# Patient Record
Sex: MALE | Race: AMERICAN INDIAN OR ALASKA NATIVE
[De-identification: names, ages, dates, MRNs, and addresses within clinical notes are randomized per-mention and may not be internally consistent; named-entity substitution may affect disease eponyms.]

---

## 2020-06-11 ENCOUNTER — HOSPITAL ENCOUNTER (INPATIENT)
Dept: HOSPITAL 5 - ED | Age: 65
LOS: 3 days | Discharge: HOME | DRG: 388 | End: 2020-06-14
Attending: INTERNAL MEDICINE | Admitting: INTERNAL MEDICINE
Payer: MEDICARE

## 2020-06-11 DIAGNOSIS — K56.609: Primary | ICD-10-CM

## 2020-06-11 DIAGNOSIS — Z82.49: ICD-10-CM

## 2020-06-11 DIAGNOSIS — E87.6: ICD-10-CM

## 2020-06-11 DIAGNOSIS — Z79.899: ICD-10-CM

## 2020-06-11 DIAGNOSIS — F17.200: ICD-10-CM

## 2020-06-11 DIAGNOSIS — N17.0: ICD-10-CM

## 2020-06-11 DIAGNOSIS — I73.9: ICD-10-CM

## 2020-06-11 DIAGNOSIS — E87.1: ICD-10-CM

## 2020-06-11 DIAGNOSIS — I10: ICD-10-CM

## 2020-06-11 LAB
ALBUMIN SERPL-MCNC: (no result) G/DL (ref 3.9–5)
ALBUMIN SERPL-MCNC: 3.7 G/DL (ref 3.9–5)
ALT SERPL-CCNC: (no result) UNITS/L (ref 7–56)
ALT SERPL-CCNC: 12 UNITS/L (ref 7–56)
APTT BLD: (no result) SEC. (ref 24.2–36.6)
APTT BLD: 25.7 SEC. (ref 24.2–36.6)
BILIRUB DIRECT SERPL-MCNC: 0.3 MG/DL (ref 0–0.2)
BUN SERPL-MCNC: (no result) MG/DL (ref 9–20)
BUN SERPL-MCNC: 46 MG/DL (ref 9–20)
BUN/CREAT SERPL: (no result) %
BUN/CREAT SERPL: 20 %
CALCIUM SERPL-MCNC: (no result) MG/DL (ref 8.4–10.2)
CALCIUM SERPL-MCNC: 8.6 MG/DL (ref 8.4–10.2)
HCT VFR BLD CALC: 44 % (ref 35.5–45.6)
HEMOLYSIS INDEX: (no result)
HEMOLYSIS INDEX: 7
HGB BLD-MCNC: 15.3 GM/DL (ref 11.8–15.2)
INR PPP: (no result) (ref 0.87–1.13)
INR PPP: 1.47 (ref 0.87–1.13)
MCHC RBC AUTO-ENTMCNC: 35 % (ref 32–34)
MCV RBC AUTO: 98 FL (ref 84–94)
PLATELET # BLD: 132 K/MM3 (ref 140–440)
RBC # BLD AUTO: 4.5 M/MM3 (ref 3.65–5.03)

## 2020-06-11 PROCEDURE — 85007 BL SMEAR W/DIFF WBC COUNT: CPT

## 2020-06-11 PROCEDURE — 36415 COLL VENOUS BLD VENIPUNCTURE: CPT

## 2020-06-11 PROCEDURE — 83735 ASSAY OF MAGNESIUM: CPT

## 2020-06-11 PROCEDURE — 85025 COMPLETE CBC W/AUTO DIFF WBC: CPT

## 2020-06-11 PROCEDURE — 84100 ASSAY OF PHOSPHORUS: CPT

## 2020-06-11 PROCEDURE — 84132 ASSAY OF SERUM POTASSIUM: CPT

## 2020-06-11 PROCEDURE — 85730 THROMBOPLASTIN TIME PARTIAL: CPT

## 2020-06-11 PROCEDURE — 74174 CTA ABD&PLVS W/CONTRAST: CPT

## 2020-06-11 PROCEDURE — 87641 MR-STAPH DNA AMP PROBE: CPT

## 2020-06-11 PROCEDURE — 93005 ELECTROCARDIOGRAM TRACING: CPT

## 2020-06-11 PROCEDURE — 74018 RADEX ABDOMEN 1 VIEW: CPT

## 2020-06-11 PROCEDURE — 82140 ASSAY OF AMMONIA: CPT

## 2020-06-11 PROCEDURE — 0D9670Z DRAINAGE OF STOMACH WITH DRAINAGE DEVICE, VIA NATURAL OR ARTIFICIAL OPENING: ICD-10-PCS | Performed by: INTERNAL MEDICINE

## 2020-06-11 PROCEDURE — 85027 COMPLETE CBC AUTOMATED: CPT

## 2020-06-11 PROCEDURE — 80053 COMPREHEN METABOLIC PANEL: CPT

## 2020-06-11 PROCEDURE — 80048 BASIC METABOLIC PNL TOTAL CA: CPT

## 2020-06-11 PROCEDURE — 85610 PROTHROMBIN TIME: CPT

## 2020-06-11 PROCEDURE — 96374 THER/PROPH/DIAG INJ IV PUSH: CPT

## 2020-06-11 PROCEDURE — 80076 HEPATIC FUNCTION PANEL: CPT

## 2020-06-11 PROCEDURE — 96375 TX/PRO/DX INJ NEW DRUG ADDON: CPT

## 2020-06-11 RX ADMIN — Medication SCH ML: at 22:55

## 2020-06-11 RX ADMIN — SODIUM CHLORIDE SCH MLS/HR: 0.9 INJECTION, SOLUTION INTRAVENOUS at 22:56

## 2020-06-11 RX ADMIN — POTASSIUM CHLORIDE SCH MLS/HR: 10 INJECTION, SOLUTION INTRAVENOUS at 19:55

## 2020-06-11 RX ADMIN — POTASSIUM CHLORIDE SCH MLS/HR: 10 INJECTION, SOLUTION INTRAVENOUS at 18:45

## 2020-06-11 NOTE — CONSULTATION
History of Present Illness





- Reason for Consult


Consult date: 06/11/20


acute renal failure





- History of Present Illness


The patient is a 64 YO male with history significant for Hypertension, AAA and 

erectile dysfunction s/p penile implant who presented to [facility] ED 6/11 from the 

primary care physician's office with complaints of abdominal pain, nausea and 

vomiting for the past 3 days.  He has several episodes of non-bloody non-bilious

vomiting and associated with constipation.  He went to his primary care's office

for evaluation.  They recommended that he come to the emergency room for further

evaluation.  Evaluation emergency department revealed a small bowel obstruction.

 General surgery was consulted.  Initial BP was 90/70.  Labs significant for 

Creat 2.3, BUN 46 and K 3.  In 2018 his creatinine was normal.  Nephrology was 

consulted for further evaluation.





Past History


Past Medical History: hypertension, hyperlipidemia, PVD, other (See HPI.)


Past Surgical History: abd. aortic aneurysm repair, hernia repair (LIH (open)), 

Other (penile implant)


Social history: smoking.  denies: alcohol abuse


Family history: no significant family history





Medications and Allergies


                                    Allergies











Allergy/AdvReac Type Severity Reaction Status Date / Time


 


No Known Allergies Allergy   Unverified 11/10/18 09:51











                                Home Medications











 Medication  Instructions  Recorded  Confirmed  Last Taken  Type


 


AtorvaSTATin 40 mg PO QHS 06/11/20 06/11/20 Unknown History


 


Metoprolol Succinate 50 mg PO BID 06/11/20 06/11/20 Unknown History











Active Meds: 


Active Medications





Acetaminophen (Tylenol)  650 mg PO Q4H PRN


   PRN Reason: Pain MILD(1-3)/Fever >100.5/HA


Albuterol (Proventil)  2.5 mg IH Q4HRT PRN


   PRN Reason: Shortness Of Breath


Hydralazine HCl (Apresoline)  10 mg IV Q6HR PRN


   PRN Reason: Hypertension


Sodium Chloride (Nacl 0.9% 1000 Ml)  1,000 mls @ 100 mls/hr IV AS DIRECT LEO


Morphine Sulfate (Morphine)  2 mg IV Q4H PRN


   PRN Reason: Pain, Moderate (4-6)


Ondansetron HCl (Zofran)  4 mg IV Q8H PRN


   PRN Reason: Nausea And Vomiting


Sodium Chloride (Sodium Chloride Flush Syringe 10 Ml)  10 ml IV BID LEO


Sodium Chloride (Sodium Chloride Flush Syringe 10 Ml)  10 ml IV PRN PRN


   PRN Reason: LINE FLUSH











Review of Systems


Constitutional: weight loss, anorexia, fatigue, no weight gain, no fever, no 

chills


Cardiovascular: high blood pressure, no chest pain, no orthopnea, no edema, no 

syncope, no lightheadedness, no shortness of breath, no leg edema


Respiratory: no cough, no hemoptysis, no shortness of breath, no dyspnea on 

exertion


Gastrointestinal: abdominal pain, nausea, vomiting, constipation, no diarrhea, 

no hematemesis, no melena, no hematochezia


Genitourinary Male: no dysuria, no hematuria


Rectal: no bleeding


Integumentary: no rash


Neurological: no convulsions, no aphasia, no change in speech, no change in 

mentation, no confusion





Exam





- Vital Signs


Vital signs: 


                                   Vital Signs











Temp Pulse Resp BP Pulse Ox


 


 98.6 F   110 H  24   90/70   97 


 


 06/11/20 14:31  06/11/20 14:31  06/11/20 14:31  06/11/20 14:31  06/11/20 14:31














- General Appearance


General appearance: well-developed, appears stated age, other (no tin distress, 

NGT )


EENT: ATNC, PERRL, mucous membranes dry, hearing intact, vision intact


Neck: Present: neck supple, trachea midline


Respiratory: Clear to Ascultation


Heart: regular, S1S2, no murmurs


Gastrointestinal: Present: normoactive bowel sounds.  Absent: tenderness, 

distended


Integumentary: no rash, warm and dry


Neurologic: no focal deficit, no asterixis, alert and oriented x3


Musculoskeletal: Present: other (no edema)


Psychiatric: cooperative





Results





- Lab Results





                                 06/11/20 16:31





                                 06/11/20 16:31


                             Most recent lab results











Calcium  8.6 mg/dL (8.4-10.2)   06/11/20  16:31    


 


Magnesium  2.10 mg/dL (1.7-2.3)   06/11/20  16:31    














- Image


Kidney/bladder ultrasound: other





Assessment and Plan





1. Acute kidney injury:


Vasomotor ILEANA in setting of volume depletion.


Previously normal renal function.


CT abdomen was negative for hydro.


Urine studies ordered.


Patient also received IV contrast.


Continue IV fluids.


Monitor renal function.


Renal prognosis is guarded.


Avoid nephrotoxic agents.


Meds dosage based on GFR. 





2. FEN:


Hypokalemia, replete K.


Monitor lytes and volume status.





3. SBO (small bowel obstruction):


General Surgery consulted.


NG tube decompression.


IV fluids.





4. Hypertension:


Follow BP, was low.





5. H/o AAA repair.

## 2020-06-11 NOTE — CAT SCAN REPORT
CTA ABDOMEN AND PELVIS WITH IV CONTRAST



INDICATION: History of aortic aneurysm, abdominal pain



CONTRAST: 100 cc Omnipaque 300 IV



COMPARISON: Partial images sets from CT abdomen and pelvis 11/11/2018 and CTA abdomen and pelvis 11/1
2/2018



Three-plane MIP reconstructions were produced. All CT scans at this location are performed using CT d
ose reduction for ALARA by means of automated exposure control. 



FINDINGS: Mild atelectasis and/or scarring is seen in the lung bases, more on the right. No pneumoper
itoneum is seen. Interval revision of the penile prosthesis apparatus is noted with now a catheter an
d apparent balloon pump extending into the left lower lateral abdominal wall subcutaneous tissues. In
 this subcutaneous area adjacent to the device is a somewhat serpiginous fluid collection which is pr
esumably part of the inflation balloon. Though somewhat resembling a loop of bowel shape, I do not se
e a definite herniation of bowel through this area.



No urinary obstructive changes are seen. No masses are obvious. Gallbladder and bile ducts appear wit
hin normal limits. No free fluid is seen.



The lower abdominal aortic aneurysm and dissection is now less prominent and there are interval surgi
reji changes in this area. The dissection is similar in appearance and less extensive and the prominen
t saccular aneurysm which measured up to a diameter of 7 cm previously now shows a maximal diameter o
f 3.9 cm. The tortuous and ectatic common iliac arteries are again seen similar to prior study with f
low seen more distally in the iliac system though tapering inferiorly, thought likely due to the phas
e of contrast in this very early contrast phase with significant contrast left in the heart at this p
oint. I do not see extravasation or other obvious acute abnormality of the aorta or vascular structur
es. Good opacification of the major aortic branches is seen without obvious significant stenosis thou
gh there is mild narrowing of the proximal portion of the left renal artery. Left renal artery shows 
a small accessory artery as well. The mesenteric vessels appear well opacified.



The stomach is prominently dilated with fluid, food, and air, predominantly fluid. There is prominent
 dilatation of the proximal half of the small bowel with air-fluid levels seen. Distal small bowel lo
ops are not dilated. Colon is mostly decompressed. Pattern is consistent with high-grade small bowel 
obstruction of unclear source. The rectum may show mild wall edema as can be seen with proctitis and 
was not obvious previously. No evidence of perforation or abscess is seen.





IMPRESSION: 

1. Significant improvement in appearance of the lower abdominal aortic dissection and saccular aneury
sm with interval surgical change. No acute aortic abnormality is seen.

2. Evidence of high-grade mid level small bowel obstruction

3. Interval surgical changes in the pelvis as above. The subcutaneous fluid collection presumably rel
ates to a balloon apparatus with fluid for the penile pump.

4. Question of mild proctitis



Discussed with Dr. Kiser



Signer Name: Mika Benson MD 

Signed: 6/11/2020 4:43 PM

Workstation Name: [id number]

## 2020-06-11 NOTE — EMERGENCY DEPARTMENT REPORT
ED General Adult HPI





- General


Chief complaint: Abdominal Pain


Stated complaint: ABD PAIN


PUI?: No


Time Seen by Provider: 20 14:25


Source: patient, EMS ( EMS documentation not available at time of chart 

dictation ), RN notes reviewed, old records reviewed


Mode of arrival: Stretcher


Limitations: No Limitations





- History of Present Illness


Initial comments: 





Patient is a 65-year-old gentleman.  He has a complicated past medical history. 

He has a known history of infrarenal aortic aneurysm with partial dissection.





He also has a history of penile prosthesis.





He had an outpatient CT scan of the abdomen pelvis performed recently, for 

nonspecific abdominal pain, which was a technically inadequate study, secondary 

to lack of IV contrast.  He presents to the ER today with a complaint of diffuse

abdominal pain, initially without nausea or vomiting..  He denies headache, neck

pain, chest pain, shortness of breath, hematemesis, bright red blood per rectum.

 He also thinks that he is "constipated."





He denies extremity weakness and or numbness.





He reports decrease stool, but is passing gas intermittently.





Symptoms present over the past few days, and gradually getting worse.  He does 

not describe exacerbating or relieving factors.  At the moment, he is not 

nauseous or vomiting.














-: Gradual, days(s)


Location: abdomen


Severity scale (0 -10): 10


Consistency: constant


Improves with: none


Worsens with: none





- Related Data


                                Home Medications











 Medication  Instructions  Recorded  Confirmed  Last Taken


 


AtorvaSTATin 40 mg PO QHS 20 Unknown


 


Metoprolol Succinate 50 mg PO BID 20 Unknown











                                    Allergies











Allergy/AdvReac Type Severity Reaction Status Date / Time


 


No Known Allergies Allergy   Unverified 11/10/18 09:51














ED Review of Systems


ROS: 


Stated complaint: ABD PAIN


Other details as noted in HPI





Constitutional: weakness.  denies: fever


Eyes: denies: eye discharge


ENT: denies: epistaxis


Respiratory: denies: cough


Cardiovascular: denies: chest pain


Gastrointestinal: abdominal pain, constipation


Genitourinary: denies: dysuria, testicular pain


Musculoskeletal: denies: back pain


Skin: denies: lesions


Neurological: weakness


Psychiatric: as per HPI


Hematological/Lymphatic: as per HPI





ED Past Medical Hx





- Past Medical History


Previous Medical History?: Yes


Hx Hypertension: Yes


Hx Congestive Heart Failure: No


Hx Diabetes: No


Hx Asthma: No


Hx COPD: No


Additional medical history: Constipation, Left eye prosthesis, Penile implant, 

aortic aneurysm repair





- Surgical History


Past Surgical History?: Yes


Additional Surgical History: left eye surgery, Penile surgery, aortic aneurysm 

repair





- Social History


Smoking Status: Never Smoker


Substance Use Type: None





- Medications


Home Medications: 


                                Home Medications











 Medication  Instructions  Recorded  Confirmed  Last Taken  Type


 


AtorvaSTATin 40 mg PO QHS 20 Unknown History


 


Metoprolol Succinate 50 mg PO BID 20 Unknown History














ED Physical Exam





- General


Limitations: No Limitations


General appearance: alert, anxious





- Head


Head exam: Present: atraumatic, normocephalic





- Eye


Eye exam: Present: normal appearance, EOMI.  Absent: nystagmus





- ENT


ENT exam: Present: normal exam, mucous membranes dry, normal external ear exam





- Neck


Neck exam: Present: normal inspection, full ROM.  Absent: tenderness, 

meningismus





- Respiratory


Respiratory exam: Present: normal lung sounds bilaterally.  Absent: respiratory 

distress, wheezes, rales, rhonchi, stridor





- Cardiovascular


Cardiovascular Exam: Present: tachycardia, irregular rhythm, normal heart 

sounds.  Absent: systolic murmur, diastolic murmur, rubs, gallop





- GI/Abdominal


GI/Abdominal exam: Present: soft, distended, diminished bowel sounds.  Absent: 

tenderness, guarding, rebound, rigid, pulsatile mass





- Rectal


Rectal exam: Present: deferred





- Extremities Exam


Extremities exam: Present: normal inspection, full ROM, other (2+ femoral pulses

noted bilaterally.  Weakened pulses noted in the bilateral upper and lower 

extremities.  Pelvis is stable.  No long bony tenderness.  Delayed capillary 

refill).  Absent: normal capillary refill (Delayed capillary refill), calf 

tenderness





- Back Exam


Back exam: Present: normal inspection, full ROM.  Absent: tenderness, CVA 

tenderness (R), CVA tenderness (L), paraspinal tenderness, vertebral tenderness





- Neurological Exam


Neurological exam: Present: alert, other (No facial droop.  Tongue midline.  

Extraocular movements intact bilaterally.  Facial sensation intact to light 

touch in V1, V2, V3 distribution bilaterally.  5 and a 5 strength in 4 e

xtremities.  Sensation intact to light touch in 4 extremities.).  Absent: motor 

sensory deficit





- Psychiatric


Psychiatric exam: Present: flat affect





- Skin


Skin exam: Present: warm, dry, intact, normal color.  Absent: rash





ED Course


                                   Vital Signs











  20





  14:31 15:32 16:00


 


Temperature 98.6 F  


 


Pulse Rate 110 H 81 87


 


Respiratory 24 13 21





Rate   


 


Blood Pressure   117/85


 


Blood Pressure 90/70  





[Left]   


 


O2 Sat by Pulse 97 62 L 





Oximetry   














  20





  16:30 16:40 16:50


 


Temperature   


 


Pulse Rate 75 74 90


 


Respiratory 28 H 15 15





Rate   


 


Blood Pressure 116/63 116/63 116/63


 


Blood Pressure   





[Left]   


 


O2 Sat by Pulse  100 





Oximetry   














  20





  17:00 17:10 17:20


 


Temperature   


 


Pulse Rate 79 82 85


 


Respiratory 15 24 16





Rate   


 


Blood Pressure 124/80 124/80 124/80


 


Blood Pressure   





[Left]   


 


O2 Sat by Pulse   





Oximetry   














  20





  17:30 17:40 17:50


 


Temperature   


 


Pulse Rate 84 83 89


 


Respiratory 13 18 21





Rate   


 


Blood Pressure 126/90 126/90 126/90


 


Blood Pressure   





[Left]   


 


O2 Sat by Pulse 77 L  





Oximetry   














  20





  18:00 18:10 18:20


 


Temperature   


 


Pulse Rate 77 86 83


 


Respiratory 15 19 16





Rate   


 


Blood Pressure 138/84 138/84 138/84


 


Blood Pressure   





[Left]   


 


O2 Sat by Pulse  93 





Oximetry   














  20





  18:30 18:40 18:41


 


Temperature   


 


Pulse Rate 83 80 83


 


Respiratory 18 14 18





Rate   


 


Blood Pressure 119/86 119/86 


 


Blood Pressure   119/86





[Left]   


 


O2 Sat by Pulse   





Oximetry   














  20





  18:50 19:00 19:10


 


Temperature   


 


Pulse Rate 79 80 79


 


Respiratory 23 18 11 L





Rate   


 


Blood Pressure 119/86 116/88 116/88


 


Blood Pressure   





[Left]   


 


O2 Sat by Pulse 93  94





Oximetry   














  20





  19:20 19:30 19:40


 


Temperature   


 


Pulse Rate 81 75 81


 


Respiratory 17 20 21





Rate   


 


Blood Pressure 116/88 125/84 125/84


 


Blood Pressure   





[Left]   


 


O2 Sat by Pulse 94 94 





Oximetry   














  20





  19:50


 


Temperature 


 


Pulse Rate 73


 


Respiratory 16





Rate 


 


Blood Pressure 125/84


 


Blood Pressure 





[Left] 


 


O2 Sat by Pulse 88





Oximetry 














- EJ/Peripheral Line


  ** Arm R


Time Out Performed: Yes


Indications: nurses unable to establis


Skin Cleansed in Sterile Fashion: Yes


Size: 20


Dressing Placed: Tegaderm


Patient Tolerated Procedure: well





ED Medical Decision Making





- Lab Data


Result diagrams: 


                                 20 05:26





                                 20 08:02








                                   Vital Signs











  20





  14:31 15:32 16:00


 


Temperature 98.6 F  


 


Pulse Rate 110 H 81 87


 


Respiratory 24 13 21





Rate   


 


Blood Pressure   117/85


 


Blood Pressure 90/70  





[Left]   


 


O2 Sat by Pulse 97 62 L 





Oximetry   











                                   Lab Results











  20 Range/Units





  15:45 15:45 15:45 


 


PT  TNR    


 


INR  TNR    


 


APTT  TNR    


 


Sodium   148 H   (137-145)  mmol/L


 


Chloride   136.8 H   ()  mmol/L


 


BUN   9   (9-20)  mg/dL


 


Creatinine   0.2 L   (0.8-1.5)  mg/dL


 


Estimated GFR   > 60   ml/min


 


BUN/Creatinine Ratio   45   %


 


Lactic Acid    0.40 L  (0.7-2.0)  mmol/L


 


Total Bilirubin   < 0.20   (0.1-1.2)  mg/dL


 


Alkaline Phosphatase   6 L   ()  units/L


 


Total Protein   0.8 L   (6.3-8.2)  g/dL


 


Albumin   0.5 L   (3.9-5)  g/dL


 


Albumin/Globulin Ratio   1.7   %














- EKG Data





20 17:08


EKG shows sinus rhythm, 76 bpm, normal axis, QTC is prolonged, left ventricular 

hypertrophy, and motion artifact.  The EKG is abnormal.  It is not a STEMI.  

There is no prior EKG available for my comparison





- Radiology Data


Radiology results: report reviewed, image reviewed





Print Report











Referring Physician: DESMOND KISER 


 


Patient Name: NESSA MARAVILLA 


 


Patient ID: [id number] 


 


YOB: 1955 


 


Sex: Male 


 


Accession: [id number] 


 


Report Date: 2020 


 


Report Status: Finalized 


 


Findings


[facility] [address] [place] [address] Cat

 Scan Report Signed Patient: NESSA MARAVILLA MR#:  [id number] : 1955 

Acct:[id number] Age/Sex: 65 / M ADM Date: 20 Loc: ED Attending Dr: 

Ordering Physician: DESMOND KISER MD Date of Service: 20 Procedure(s):

 CT angio abdomen pelvis Accession Number(s): [id number] cc: DESMOND KISER MD 

CTA ABDOMEN AND PELVIS WITH IV CONTRAST INDICATION: History of aortic aneurysm, 

abdominal pain CONTRAST: 100 cc Omnipaque 300 IV COMPARISON: Partial images sets

 from CT abdomen and pelvis 2018 and CTA abdomen and pelvis 2018 

Three-plane MIP reconstructions were produced. All CT scans at this location are

 performed using CT dose reduction for ALARA by means of automated exposure 

control. FINDINGS: Mild atelectasis and/or scarring is seen in the lung bases, 

more on the right. No pneumoperitoneum is seen. Interval revision of the penile 

prosthesis apparatus is noted with now a catheter and apparent balloon pump 

extending into the left lower lateral abdominal wall subcutaneous tissues. In 

this subcutaneous area adjacent to the device is a somewhat serpiginous fluid 

collection which is presumably part of the inflation balloon. Though somewhat 

resembling a loop of bowel shape, I do not see a definite herniation of bowel 

through this area. No urinary obstructive changes are seen. No masses are 

obvious. Gallbladder and bile ducts appear within normal limits. No free fluid 

is seen. The lower abdominal aortic aneurysm and dissection is now less 

prominent and there are interval surgical changes in this area. The dissection 

is similar in appearance and less extensive and the prominent saccular aneurysm 

which measured up to a diameter of 7 cm previously now shows a maximal diameter 

of 3.9 cm. The tortuous and ectatic common iliac arteries are again seen similar

 to prior study with flow seen more distally in the iliac system though tapering

 inferiorly, thought likely due to the phase of contrast in this very early 

contrast phase with significant contrast left in the heart at this point. I do 

not see extravasation or other obvious acute abnormality of the aorta or 

vascular structures. Good opacification of the major aortic branches is seen 

without obvious significant stenosis though there is mild narrowing of the 

proximal portion of the left renal artery. Left renal artery shows a small 

accessory artery as well. The mesenteric vessels appear well opacified. The 

stomach is prominently dilated with fluid, food, and air, predominantly fluid. 

There is prominent dilatation of the proximal half of the small bowel with air-

fluid levels seen. Distal small bowel loops are not dilated. Colon is mostly 

decompressed. Pattern is consistent with high-grade small bowel obstruction of u

nclear source. The rectum may show mild wall edema as can be seen with proctitis

 and was not obvious previously. No evidence of perforation or abscess is seen. 








IMPRESSION: 1. Significant improvement in appearance of the lower abdominal 

aortic dissection and saccular aneurysm with interval surgical change. No acute 

aortic abnormality is seen. 2. Evidence of high-grade mid level small bowel 

obstruction 3. Interval surgical changes in the pelvis as above. The 

subcutaneous fluid collection presumably relates to a balloon apparatus with 

fluid for the penile pump. 4. Question of mild proctitis Discussed with Dr. Kiser Signer Name: Mika Benson MD Signed: 2020 4:43 PM Workstation 

Name: [id number] Transcribed By: RAMONA Dictated By: Mika Benson MD 

Electronically Authenticated By: Mika Benson MD Signed Date/Time: 

20 1643 DD/DT: 20 1632 TD/TT:   














- Medical Decision Making





Differential diagnosis, including but not limited to: Aortic dissection, AAA, 

obstruction








Assessment and plan: 65-year-old gentleman with complex abdominal surgical 

history, presenting with abdominal pain, initially hypotensive, blood pressure 

in the 90s, initially tachycardic on my exam, heart rate in the 130s, with 

thready pulses.  We were initially very concerned about aortic catastrophe.  

Therefore, we recommended emergent CT angiogram of the abdomen pelvis to further

 evaluate.  Risks, benefits, alternatives discussed with patient extensively.  

He gave verbal consent for emergent CT scan with IV contrast to evaluate for 

aortic catastrophe. 


I specifically counseled the patient about the risks of renal insufficiency, and

 possible need for hemodialysis downstream, however, I also advised patient that

 given his initial hypotension, thready pulses, hemodynamic instability 

initially, we needed to obtain expedited and definitive diagnostic imaging.  

Therefore, the patient provided verbal and written consent for CT angiogram with

 IV contrast.  This was witnessed by multiple ER staff members, including Mr. Earnest Oh.





 Fortunately, no aortic emergent condition was noted.  However, he was noted to 

have a high-grade small bowel obstruction.





In addition, he is now actively vomiting.





We will treat his symptoms with fluids, pain medication, and nausea medication.





Advised patient that we would recommend admission to the hospital for supportive

 care.





Contacted general surgery on-call, Dr. Saunders, discussed the patient's history, 

physical, and pertinent imaging studies.  Nasogastric tube to be ordered.  

Discussed findings with patient, who verbalized understanding.  He states he is 

amenable to hospitalization.





Hospital physician, Dr. Hanley to admit patient to the medical service.





He was found to have renal insufficiency,  likely multifactorial.  Contacted 

nephrology on-call, Dr. Rosales, his group will follow in consultation.








Critical Care Time: Yes


Critical care time in (mins) excluding proc time.: 35


Critical care attestation.: 


If time is entered above; I have spent that time in minutes in the direct care 

of this critically ill patient, excluding procedure time.





Critical Care Time: 





Critical care time includes multiple bedside re-evaluations, interpretation of 

laboratory studies, current radiology studies, prior radiology studies, 

discussion with multiple consulting services, including general surgery, 

hospital medicine, and nephrology.  This does not include procedure time.





ED Disposition


Clinical Impression: 


 SBO (small bowel obstruction), Renal insufficiency, Hypokalemia





Disposition:  OP ADMIT IP TO THIS HOSP


Is pt being admited?: Yes


Does the pt Need Aspirin: No


Condition: Serious

## 2020-06-11 NOTE — HISTORY AND PHYSICAL REPORT
History of Present Illness


Chief complaint: 





I have pain in my stomach


History of present illness: 


66 YO Male with HTN, AAA S/P Repair, Chronic Constipation presents to ED for 

evaluation.  Patient states that he has experienced abdominal pain over the last

3 days.  Patient states that his pain is currently 10/10, crampy, associated 

with nausea and multiple episodes of vomiting.  Patient underwent a CT scan of 

the abdomen and pelvis as an outpatient on yesterday and presented to his 

primary care physician's office today to review results.  Patient was seen and 

evaluated by his primary care physician and instructed to seek further care.  

EMS notified and the patient was subsequently transported to [facility] for further 

evaluation and care.  Patient seen and evaluated in the emergency department.  

Lab and imaging studies were reviewed.  Patient found to have evidence of a 

small bowel obstruction.  Surgical team consulted in ED.  Patient underwent 

placement of an NG tube for gastric decompression.  Patient admitted to surgical

floor due to increased risk of decompensation.  Patient denies fever, chills, 

chest pain, palpitation, productive cough, skin rash, recent ill contact, 

hematemesis, bright red blood per rectum, melena, ingestion of food/water from 

new or different sources.  Prior admission on 11/10/2018 reviewed.  All 

medication listed at time of admission has been reconciled.  Advanced care 

planning conducted in the emergency department.








Past History


Past Medical History: hypertension, other (See HPI)


Past Surgical History: Other (left eye surgery, Penile surgery, aortic aneurysm 

repair)


Social history: single.  denies: smoking, alcohol abuse, prescription drug abuse


Family history: hypertension





Medications and Allergies


                                    Allergies











Allergy/AdvReac Type Severity Reaction Status Date / Time


 


No Known Allergies Allergy   Unverified 11/10/18 09:51











                                Home Medications











 Medication  Instructions  Recorded  Confirmed  Last Taken  Type


 


Valsartan [Diovan] 80 mg PO DAILY 11/10/18 11/10/18 11/10/18 History











Active Meds: 


Active Medications





Potassium Chloride (Kcl 10meq/100ml)  10 meq in 100 mls @ 100 mls/hr IV Q1H LEO


   Stop: 06/11/20 19:59











Review of Systems


Constitutional: no weight loss, no weight gain, no fever, no chills


Ears, nose, mouth and throat: no ear pain, no ear discharge, no tinnitis, no 

decreased hearing, no nasal congestion, no nasal discharge


Cardiovascular: no chest pain, no orthopnea, no palpitations, no rapid/irregular

 heart beat, no edema, no syncope, no lightheadedness


Respiratory: no cough, no cough with sputum, no excessive sputum, no shortness 

of breath, no dyspnea on exertion


Gastrointestinal: abdominal pain, nausea, vomiting, no diarrhea, no 

constipation, no change in bowel habits, no hematemesis, no coffee ground 

emesis, no BRBPR, no melena, no hematochezia


Genitourinary Male: no hematuria, no flank pain, no discharge, no urinary 

frequency, no urinary hesitancy


Rectal: no pain, no incontinence, no bleeding


Musculoskeletal: no neck stiffness, no neck pain, no shooting arm pain, no arm 

numbness/tingling, no low back pain


Integumentary: no rash, no pruritis, no redness, no sores, no wounds, no 

jaundice


Neurological: no transient paralysis, no paralysis, no weakness, no parathesias,

 no numbness, no tingling, no seizures, no syncope


Psychiatric: no anxiety, no memory loss, no change in sleep habits, no sleep 

disturbances, no insomnia, no hypersomnia, no change in libido, no 

disorientation


Endocrine: no cold intolerance, no heat intolerance, no excessive sweating, no 

flushing


Hematologic/Lymphatic: no easy bruising, no easy bleeding, no lymphadenopathy


Allergic/Immunologic: no urticaria, no allergic rhinitis, no persistent 

infections, no anaphylaxis, no angioedema





Exam





- Constitutional


Vitals: 


                                        











Temp Pulse Resp BP Pulse Ox


 


 98.6 F   87   21   117/85   62 L


 


 06/11/20 14:31  06/11/20 16:00  06/11/20 16:00  06/11/20 16:00  06/11/20 15:32











General appearance: Present: mild distress





- EENT


Eyes: Present: PERRL


ENT: hearing intact, clear oral mucosa





- Neck


Neck: Present: supple, normal ROM





- Respiratory


Respiratory effort: normal


Respiratory: bilateral: CTA





- Cardiovascular


Heart Sounds: Present: S1 & S2.  Absent: rub, click





- Extremities


Extremities: pulses symmetrical, No edema


Peripheral Pulses: within normal limits





- Abdominal


General gastrointestinal: Present: soft, non-tender, tender, normal bowel 

sounds.  Absent: hepatomegaly, splenomegaly


Male genitourinary: Present: normal





- Integumentary


Integumentary: Present: clear, warm, dry





- Musculoskeletal


Musculoskeletal: gait normal, strength equal bilaterally





- Psychiatric


Psychiatric: appropriate mood/affect, intact judgment & insight





- Neurologic


Neurologic: CNII-XII intact, moves all extremities





Results





- Labs


CBC & Chem 7: 


                                 06/11/20 16:31





                                 06/11/20 16:31


Labs: 


                              Abnormal lab results











  06/11/20 06/11/20 06/11/20 Range/Units





  15:45 16:31 16:31 


 


Hgb   15.3 H   (11.8-15.2)  gm/dl


 


MCV   98 H   (84-94)  fl


 


MCH   34 H   (28-32)  pg


 


MCHC   35 H   (32-34)  %


 


Plt Count   132 L   (140-440)  K/mm3


 


PT     (12.2-14.9)  Sec.


 


INR     (0.87-1.13)  


 


Sodium    135 L  (137-145)  mmol/L


 


Potassium    3.0 L  (3.6-5.0)  mmol/L


 


Chloride    93.8 L  ()  mmol/L


 


BUN    46 H  (9-20)  mg/dL


 


Creatinine    2.3 H  (0.8-1.5)  mg/dL


 


Glucose    107 H  ()  mg/dL


 


Lactic Acid  0.40 L    (0.7-2.0)  mmol/L


 


Direct Bilirubin     (0-0.2)  mg/dL


 


Albumin     (3.9-5)  g/dL














  06/11/20 06/11/20 Range/Units





  16:31 16:31 


 


Hgb    (11.8-15.2)  gm/dl


 


MCV    (84-94)  fl


 


MCH    (28-32)  pg


 


MCHC    (32-34)  %


 


Plt Count    (140-440)  K/mm3


 


PT  17.5 H   (12.2-14.9)  Sec.


 


INR  1.47 H   (0.87-1.13)  


 


Sodium    (137-145)  mmol/L


 


Potassium    (3.6-5.0)  mmol/L


 


Chloride    ()  mmol/L


 


BUN    (9-20)  mg/dL


 


Creatinine    (0.8-1.5)  mg/dL


 


Glucose    ()  mg/dL


 


Lactic Acid    (0.7-2.0)  mmol/L


 


Direct Bilirubin   0.3 H  (0-0.2)  mg/dL


 


Albumin   3.7 L  (3.9-5)  g/dL














Assessment and Plan





- Patient Problems


(1) SBO (small bowel obstruction)


Current Visit: Yes   Status: Acute   


Plan to address problem: 


CT scan abdomen and pelvis results reviewed, surgical team consulted in ED, NG 

tube for gastric decompression, serial abdominal exam, bowel rest, IV fluid 

resuscitation, supportive care.








(2) HTN (hypertension)


Current Visit: No   Status: Chronic   


Qualifiers: 


   Hypertension type: essential hypertension   Qualified Code(s): I10 - 

Essential (primary) hypertension   


Plan to address problem: 


Monitor blood pressure every shift, pain control, supportive care.








(3) DVT prophylaxis


Current Visit: No   Status: Acute   


Plan to address problem: 


SCD to bilateral lower extremities while in bed, patient is ambulatory.








(4) Advance care planning


Current Visit: Yes   Status: Acute   


Plan to address problem: 


Disease education conducted, patient knowledges understanding and agreement with

 care plan, patient is full code, +30 minutes.

## 2020-06-12 LAB
ALBUMIN SERPL-MCNC: 3.4 G/DL (ref 3.9–5)
ALT SERPL-CCNC: 10 UNITS/L (ref 7–56)
ANISOCYTOSIS BLD QL SMEAR: (no result)
BAND NEUTROPHILS # (MANUAL): 0 K/MM3
BUN SERPL-MCNC: 41 MG/DL (ref 9–20)
BUN/CREAT SERPL: 24 %
BURR CELLS BLD QL SMEAR: (no result)
CALCIUM SERPL-MCNC: 8.2 MG/DL (ref 8.4–10.2)
HCT VFR BLD CALC: 43.5 % (ref 35.5–45.6)
HEMOLYSIS INDEX: 84
HGB BLD-MCNC: 14.9 GM/DL (ref 11.8–15.2)
MCHC RBC AUTO-ENTMCNC: 34 % (ref 32–34)
MCV RBC AUTO: 97 FL (ref 84–94)
MYELOCYTES # (MANUAL): 0 K/MM3
PLATELET # BLD: 122 K/MM3 (ref 140–440)
POIKILOCYTOSIS BLD QL SMEAR: (no result)
PROMYELOCYTES # (MANUAL): 0 K/MM3
RBC # BLD AUTO: 4.49 M/MM3 (ref 3.65–5.03)
TOTAL CELLS COUNTED BLD: 100

## 2020-06-12 RX ADMIN — Medication SCH ML: at 10:33

## 2020-06-12 RX ADMIN — SODIUM CHLORIDE SCH MLS/HR: 0.9 INJECTION, SOLUTION INTRAVENOUS at 10:33

## 2020-06-12 RX ADMIN — Medication SCH ML: at 21:27

## 2020-06-12 RX ADMIN — SODIUM CHLORIDE SCH MLS/HR: 0.9 INJECTION, SOLUTION INTRAVENOUS at 21:26

## 2020-06-12 NOTE — XRAY REPORT
ABDOMEN 1 VIEW(S)



INDICATION / CLINICAL INFORMATION:  f/u on SBO status.



COMPARISON:  CT abdomen pelvis 6/11/2020



FINDINGS:



TUBES / LINES: Nasogastric tube sidehole in distal tip terminating in the fundus of the stomach.

BOWEL GAS PATTERN: Small bowel dilatation has decreased by 50% since nasogastric tube placement. Mild
 small bowel dilatation persists. There is trace gas and stool in the colon. Multiple surgical clips 
are noted in the epigastric region, correlate with surgical history. 

FREE AIR / EXTRALUMINAL GAS: None seen.



ADDITIONAL FINDINGS: No significant additional findings.



IMPRESSION:

50% improvement in the small bowel obstruction pattern since nasogastric tube placement.



Signer Name: Lorenzo Huitron Jr, MD 

Signed: 6/12/2020 8:01 AM

Workstation Name: [id number]

## 2020-06-12 NOTE — PROGRESS NOTE
Assessment and Plan





- Patient Problems


(1) SBO (small bowel obstruction)


Current Visit: Yes   Status: Acute   


Plan to address problem: 


Pt stable.  Patient feeling much better.  X-ray is much improved.  Exam is 

benign.





Would continue with NG tube decompression for now.  Encourage patient to 

ambulate.  Continue n.p.o. status.





Recommendations:





1.  Continue NG tube decompression


2.  Ambulate


3.  Serial abdominal x-rays in the morning


4.  Serial abdominal exams.





We will follow along.  Please call with any questions.





Time=10min








Subjective


Date of service: 06/12/20


Patient Reports: Positive: no new complaints, feels better, pain is less, 

flatus, no bowel movement.  Negative: nausea





Objective


                               Vital Signs - 12hr











  06/11/20 06/11/20 06/12/20





  23:44 23:58 05:39


 


Temperature  98.8 F 99.5 F


 


Pulse Rate  80 81


 


Respiratory  18 18





Rate   


 


Blood Pressure  129/86 118/80


 


O2 Sat by Pulse 95 99 97





Oximetry   














  06/12/20 06/12/20





  05:58 07:21


 


Temperature  98.6 F


 


Pulse Rate 73 66


 


Respiratory  21





Rate  


 


Blood Pressure 119/82 117/81


 


O2 Sat by Pulse  97





Oximetry  














- General physical appearance


no distress, no pain, other (looks better)





- ENT


other (NGT with small amount of bilious fluid in canister)





- Respiratory


normal expansion, normal respiratory effort





- Abdomen


soft, not tender, not distended





- Integumentary


no rash, no growths, no abnormal pigmentation





- Psychiatric


oriented to time, oriented to person, oriented to place, speech is normal, 

memory intact





- Labs





                                 06/12/20 05:26





                                 06/12/20 05:26


                                 Diabetes panel











  06/11/20 06/11/20 06/11/20 Range/Units





  15:45 16:31 16:31 


 


Sodium  TNR  135 L   


 


Potassium  TNR  3.0 L   


 


Chloride  TNR  93.8 L   


 


Carbon Dioxide  TNR  24   


 


BUN  TNR  46 H   


 


Creatinine  TNR  2.3 H   


 


Glucose  TNR  107 H   


 


Calcium  TNR  8.6   


 


AST  TNR   19  


 


ALT  TNR   12  


 


Alkaline Phosphatase  TNR   63  


 


Total Protein  TNR   6.9  


 


Albumin  TNR   3.7 L  














  06/12/20 Range/Units





  05:26 


 


Sodium  138  


 


Potassium  3.8  D  


 


Chloride  98.4  


 


Carbon Dioxide  22  


 


BUN  41 H  


 


Creatinine  1.7 H  


 


Glucose  78  


 


Calcium  8.2 L  


 


AST  20  


 


ALT  10  


 


Alkaline Phosphatase  58  


 


Total Protein  6.4  


 


Albumin  3.4 L  








                                  Calcium panel











  06/11/20 06/11/20 06/11/20 Range/Units





  15:45 16:31 16:31 


 


Calcium  TNR  8.6   


 


Phosphorus     (2.5-4.5)  mg/dL


 


Albumin  TNR   3.7 L  














  06/12/20 Range/Units





  05:26 


 


Calcium  8.2 L  


 


Phosphorus  3.40  (2.5-4.5)  mg/dL


 


Albumin  3.4 L  








                                 Pituitary panel











  06/11/20 06/11/20 06/12/20 Range/Units





  15:45 16:31 05:26 


 


Sodium  TNR  135 L  138  


 


Potassium  TNR  3.0 L  3.8  D  


 


Chloride  TNR  93.8 L  98.4  


 


Carbon Dioxide  TNR  24  22  


 


BUN  TNR  46 H  41 H  


 


Creatinine  TNR  2.3 H  1.7 H  


 


Glucose  TNR  107 H  78  


 


Calcium  TNR  8.6  8.2 L  








                                  Adrenal panel











  06/11/20 06/11/20 06/11/20 Range/Units





  15:45 16:31 16:31 


 


Sodium  TNR  135 L   


 


Potassium  TNR  3.0 L   


 


Chloride  TNR  93.8 L   


 


Carbon Dioxide  TNR  24   


 


BUN  TNR  46 H   


 


Creatinine  TNR  2.3 H   


 


Glucose  TNR  107 H   


 


Calcium  TNR  8.6   


 


Total Bilirubin  TNR   0.90  


 


AST  TNR   19  


 


ALT  TNR   12  


 


Alkaline Phosphatase  TNR   63  


 


Total Protein  TNR   6.9  


 


Albumin  TNR   3.7 L  














  06/12/20 Range/Units





  05:26 


 


Sodium  138  


 


Potassium  3.8  D  


 


Chloride  98.4  


 


Carbon Dioxide  22  


 


BUN  41 H  


 


Creatinine  1.7 H  


 


Glucose  78  


 


Calcium  8.2 L  


 


Total Bilirubin  0.90  


 


AST  20  


 


ALT  10  


 


Alkaline Phosphatase  58  


 


Total Protein  6.4  


 


Albumin  3.4 L

## 2020-06-12 NOTE — PROGRESS NOTE
Assessment and Plan





1. Acute kidney injury:


Vasomotor ILEANA in setting of volume depletion.


Previously normal renal function.


CT abdomen was negative for hydro.


Urine studies ordered.


Patient also received IV contrast.


Continue IV fluids.


Creatinine is 1.7 from 2.3.


Monitor renal function.


Renal prognosis is guarded.


Avoid nephrotoxic agents.


Meds dosage based on GFR. 





2. FEN:


Hypokalemia, replete K as needed.


Monitor lytes and volume status.





3. SBO (small bowel obstruction):


Followed by General Surgery.


NG tube decompression.


IV fluids.





4. Hypertension:


Follow BP, was low.





5. H/o AAA repair.











Objective:


Patient was seen and examined at the bedside.


Doing better today.








Examination:


General appearance: well-developed, appears stated age, no distress


HEENT: atraumatic


Neck: neck supple, trachea midline


Respiratory: ctab


Heart: regular, normal heart rate, S1S2, no murmur


Gastrointestinal: soft, bowel sounds present, not tender, L LQ mass noted


Integumentary: no rash, warm and dry


Neurologic: non-focal, AOX4


Ext: no edema


: langston present











Subjective


Date of service: 06/12/20





Objective





- Vital Signs


Vital signs: 


                               Vital Signs - 12hr











  06/11/20 06/11/20 06/11/20





  21:17 21:45 23:44


 


Temperature 98.4 F 98.1 F 


 


Pulse Rate 84 74 


 


Respiratory 17 18 





Rate   


 


Blood Pressure   


 


Blood Pressure 125/84 134/95 





[Left]   


 


O2 Sat by Pulse 99 94 95





Oximetry   














  06/11/20 06/12/20 06/12/20





  23:58 05:39 05:58


 


Temperature 98.8 F 99.5 F 


 


Pulse Rate 80 81 73


 


Respiratory 18 18 





Rate   


 


Blood Pressure 129/86 118/80 119/82


 


Blood Pressure   





[Left]   


 


O2 Sat by Pulse 99 97 





Oximetry   














  06/12/20





  07:21


 


Temperature 98.6 F


 


Pulse Rate 66


 


Respiratory 21





Rate 


 


Blood Pressure 117/81


 


Blood Pressure 





[Left] 


 


O2 Sat by Pulse 97





Oximetry 














- Lab





                                 06/12/20 05:26





                                 06/13/20 04:42


                             Most recent lab results











Calcium  8.2 mg/dL (8.4-10.2)  L  06/12/20  05:26    


 


Phosphorus  3.40 mg/dL (2.5-4.5)   06/12/20  05:26    


 


Magnesium  2.10 mg/dL (1.7-2.3)   06/12/20  05:26    














Medications & Allergies





- Medications


Allergies/Adverse Reactions: 


                                    Allergies





No Known Allergies Allergy (Unverified 11/10/18 09:51)


   








Home Medications: 


                                Home Medications











 Medication  Instructions  Recorded  Confirmed  Last Taken  Type


 


AtorvaSTATin 40 mg PO QHS 06/11/20 06/11/20 Unknown History


 


Metoprolol Succinate 50 mg PO BID 06/11/20 06/11/20 Unknown History











Active Medications: 














Generic Name Dose Route Start Last Admin





  Trade Name Freq  PRN Reason Stop Dose Admin


 


Acetaminophen  650 mg  06/11/20 18:53 





  Tylenol  PO  





  Q4H PRN  





  Pain MILD(1-3)/Fever >100.5/HA  


 


Albuterol  2.5 mg  06/11/20 18:53 





  Proventil  IH  





  Q4HRT PRN  





  Shortness Of Breath  


 


Hydralazine HCl  10 mg  06/11/20 18:55 





  Apresoline  IV  





  Q6HR PRN  





  Hypertension  


 


Sodium Chloride  1,000 mls @ 100 mls/hr  06/11/20 19:00  06/11/20 22:56





  Nacl 0.9% 1000 Ml  IV   100 mls/hr





  AS DIRECT LEO   Administration


 


Morphine Sulfate  2 mg  06/11/20 18:53 





  Morphine  IV  





  Q4H PRN  





  Pain, Moderate (4-6)  


 


Ondansetron HCl  4 mg  06/11/20 18:53 





  Zofran  IV  





  Q8H PRN  





  Nausea And Vomiting  


 


Sodium Chloride  10 ml  06/11/20 22:00  06/11/20 22:55





  Sodium Chloride Flush Syringe 10 Ml  IV   10 ml





  BID LEO   Administration


 


Sodium Chloride  10 ml  06/11/20 18:53 





  Sodium Chloride Flush Syringe 10 Ml  IV  





  PRN PRN  





  LINE FLUSH

## 2020-06-13 LAB
BUN SERPL-MCNC: 29 MG/DL (ref 9–20)
BUN/CREAT SERPL: 29 %
CALCIUM SERPL-MCNC: 8.5 MG/DL (ref 8.4–10.2)
HEMOLYSIS INDEX: 7

## 2020-06-13 RX ADMIN — Medication SCH ML: at 22:11

## 2020-06-13 RX ADMIN — POTASSIUM CHLORIDE SCH MLS/HR: 10 INJECTION, SOLUTION INTRAVENOUS at 13:13

## 2020-06-13 RX ADMIN — SODIUM CHLORIDE SCH MLS/HR: 0.9 INJECTION, SOLUTION INTRAVENOUS at 07:34

## 2020-06-13 RX ADMIN — POTASSIUM CHLORIDE SCH MLS/HR: 10 INJECTION, SOLUTION INTRAVENOUS at 09:01

## 2020-06-13 RX ADMIN — POTASSIUM CHLORIDE SCH MLS/HR: 10 INJECTION, SOLUTION INTRAVENOUS at 14:56

## 2020-06-13 RX ADMIN — Medication SCH ML: at 09:05

## 2020-06-13 RX ADMIN — POTASSIUM CHLORIDE SCH MLS/HR: 10 INJECTION, SOLUTION INTRAVENOUS at 21:53

## 2020-06-13 RX ADMIN — FAMOTIDINE SCH MG: 10 INJECTION, SOLUTION INTRAVENOUS at 11:04

## 2020-06-13 RX ADMIN — POTASSIUM CHLORIDE SCH MLS/HR: 10 INJECTION, SOLUTION INTRAVENOUS at 18:15

## 2020-06-13 RX ADMIN — POTASSIUM CHLORIDE SCH MLS/HR: 10 INJECTION, SOLUTION INTRAVENOUS at 11:00

## 2020-06-13 NOTE — XRAY REPORT
ABDOMEN 2 VIEW(S)



INDICATION / CLINICAL INFORMATION:

f/u on SBO status.



COMPARISON: 

Yesterday



FINDINGS:



TUBES / LINES: Stable satisfactory device positioning.

BOWEL GAS PATTERN: Midline postoperative changes again noted with air throughout the bowel to include
 the colon. No frankly obstructive pattern seen on this exam. 

FREE AIR / EXTRALUMINAL GAS: None seen.



ADDITIONAL FINDINGS: No significant additional findings.



IMPRESSION:

1. Bowel findings as above.



Signer Name: Nick Taylor MD 

Signed: 6/13/2020 9:03 AM

Workstation Name: [id number]

## 2020-06-13 NOTE — PROGRESS NOTE
Assessment and Plan





(1) SBO (small bowel obstruction)


Current Visit: Yes   Status: Acute   


Plan to address problem: 


Pt stable.  Abdominal pain, distension resolved. Exam benign.





Abd xray 6/13/20 - images and read reviewed - unremarkable bowel pattern, no 

obstruction





Plan:


1.  NGT clamp trial until 1430 - if tolerates, NGT to be removed and pt started 

on clears


2.  Ambulate


3.  DVT ppx


4.  GI ppx


5. replace K


6. repeat BMP in am





We will follow along.  Please call with any questions.











Subjective


Date of service: 06/13/20


Narrative: 





Pt seen and examined. No complaints. No abdominal pain, n/v. States he is 

thirsty. +Flatus, No BM.





Objective


                               Vital Signs - 12hr











  06/13/20 06/13/20 06/13/20





  00:04 07:15 08:14


 


Temperature 98.7 F 98.8 F 


 


Pulse Rate 99 H 91 H 


 


Respiratory 17 18 





Rate   


 


Blood Pressure 143/102 132/86 


 


O2 Sat by Pulse 97 97 96





Oximetry   














- General physical appearance


Narrative Exam: 





Gen: AAOx3. NAD


NGT - bilious drainage in canister, blood tinged dark drainage in the tubing


CV: s1, S2+


Resp: even and unlabored


Abd: soft, NT, ND. no r/r/g


Ext; no c/c/e





- Labs





                                 06/12/20 05:26





                                 06/13/20 08:02


                                 Diabetes panel











  06/13/20 06/13/20 Range/Units





  04:42 08:02 


 


Sodium  139   (137-145)  mmol/L


 


Potassium  2.8 L* D  3.0 L  (3.6-5.0)  mmol/L


 


Chloride  98.7   ()  mmol/L


 


Carbon Dioxide  25   (22-30)  mmol/L


 


BUN  29 H   (9-20)  mg/dL


 


Creatinine  1.0   (0.8-1.5)  mg/dL


 


Glucose  78   ()  mg/dL


 


Calcium  8.5   (8.4-10.2)  mg/dL








                                  Calcium panel











  06/13/20 Range/Units





  04:42 


 


Calcium  8.5  (8.4-10.2)  mg/dL








                                 Pituitary panel











  06/13/20 06/13/20 Range/Units





  04:42 08:02 


 


Sodium  139   (137-145)  mmol/L


 


Potassium  2.8 L* D  3.0 L  (3.6-5.0)  mmol/L


 


Chloride  98.7   ()  mmol/L


 


Carbon Dioxide  25   (22-30)  mmol/L


 


BUN  29 H   (9-20)  mg/dL


 


Creatinine  1.0   (0.8-1.5)  mg/dL


 


Glucose  78   ()  mg/dL


 


Calcium  8.5   (8.4-10.2)  mg/dL








                                  Adrenal panel











  06/13/20 06/13/20 Range/Units





  04:42 08:02 


 


Sodium  139   (137-145)  mmol/L


 


Potassium  2.8 L* D  3.0 L  (3.6-5.0)  mmol/L


 


Chloride  98.7   ()  mmol/L


 


Carbon Dioxide  25   (22-30)  mmol/L


 


BUN  29 H   (9-20)  mg/dL


 


Creatinine  1.0   (0.8-1.5)  mg/dL


 


Glucose  78   ()  mg/dL


 


Calcium  8.5   (8.4-10.2)  mg/dL

## 2020-06-13 NOTE — PROGRESS NOTE
Assessment and Plan





- Patient Problems


(1) SBO (small bowel obstruction)


Current Visit: Yes   Status: Acute   


Plan to address problem: 


CT scan abdomen and pelvis results reviewed, surgical team consulted in ED, NG 

tube for gastric decompression, serial abdominal exam, bowel rest, IV fluid 

resuscitation, supportive care.








(2) HTN (hypertension)


Current Visit: No   Status: Chronic   


Qualifiers: 


   Hypertension type: essential hypertension   Qualified Code(s): I10 - 

Essential (primary) hypertension   


Plan to address problem: 


Monitor blood pressure every shift, pain control, supportive care.








(3) DVT prophylaxis


Current Visit: No   Status: Acute   


Plan to address problem: 


SCD to bilateral lower extremities while in bed, patient is ambulatory.








(4) Advance care planning


Current Visit: Yes   Status: Acute   


Plan to address problem: 


Disease education conducted, patient knowledges understanding and agreement with

care plan, patient is full code, +30 minutes.








History


Interval history: 


66 YO Male HD#3 with Partial SBO.  NG tube in place.  Patient knowledges flatus,

patient denies fever, chills, chest pain, palpitations, bright red blood per 

rectum,.  No reported nursing events.  NG tube discontinuation as per surgical 

team.





Hospitalist Physical





- Constitutional


Vitals: 


                                        











Temp Pulse Resp BP Pulse Ox


 


 100.0 F H  92 H  18   124/91   98 


 


 06/13/20 19:53  06/13/20 19:53  06/13/20 19:53  06/13/20 19:53  06/13/20 19:53











General appearance: Present: mild distress





- EENT


Eyes: Present: PERRL, EOM intact


ENT: hearing intact





- Neck


Neck: Present: supple





- Respiratory


Respiratory effort: normal


Respiratory: bilateral: CTA





- Cardiovascular


Rhythm: regular


Heart Sounds: Present: S1 & S2





- Extremities


Extremities: no ischemia


Peripheral Pulses: within normal limits





- Abdominal


General gastrointestinal: soft, non-tender, non-distended





- Integumentary


Integumentary: Present: clear, warm, dry





- Psychiatric


Psychiatric: appropriate mood/affect, cooperative





- Neurologic


Neurologic: CNII-XII intact





Results





- Labs


CBC & Chem 7: 


                                 06/12/20 05:26





                                 06/13/20 08:02


Labs: 


                             Laboratory Last Values











WBC  7.5 K/mm3 (4.5-11.0)   06/12/20  05:26    


 


RBC  4.49 M/mm3 (3.65-5.03)   06/12/20  05:26    


 


Hgb  14.9 gm/dl (11.8-15.2)   06/12/20  05:26    


 


Hct  43.5 % (35.5-45.6)   06/12/20  05:26    


 


MCV  97 fl (84-94)  H  06/12/20  05:26    


 


MCH  33 pg (28-32)  H  06/12/20  05:26    


 


MCHC  34 % (32-34)   06/12/20  05:26    


 


RDW  13.7 % (13.2-15.2)   06/12/20  05:26    


 


Plt Count  122 K/mm3 (140-440)  L  06/12/20  05:26    


 


Mono % (Auto)  Np   06/12/20  05:26    


 


Add Manual Diff  Complete   06/12/20  05:26    


 


Total Counted  100   06/12/20  05:26    


 


Seg Neuts % (Manual)  63.0 % (40.0-70.0)   06/12/20  05:26    


 


Band Neutrophils %  0 %  06/12/20  05:26    


 


Lymphocytes % (Manual)  12.0 % (13.4-35.0)  L  06/12/20  05:26    


 


Reactive Lymphs % (Man)  0 %  06/12/20  05:26    


 


Monocytes % (Manual)  20.0 % (0.0-7.3)  H  06/12/20  05:26    


 


Eosinophils % (Manual)  5.0 % (0.0-4.3)  H  06/12/20  05:26    


 


Basophils % (Manual)  0 % (0.0-1.8)   06/12/20  05:26    


 


Metamyelocytes %  0 %  06/12/20  05:26    


 


Myelocytes %  0 %  06/12/20  05:26    


 


Promyelocytes %  0 %  06/12/20  05:26    


 


Blast Cells %  0 %  06/12/20  05:26    


 


Nucleated RBC %  Not Reportable   06/12/20  05:26    


 


Seg Neutrophils # Man  4.7 K/mm3 (1.8-7.7)   06/12/20  05:26    


 


Band Neutrophils #  0.0 K/mm3  06/12/20  05:26    


 


Lymphocytes # (Manual)  0.9 K/mm3 (1.2-5.4)  L  06/12/20  05:26    


 


Abs React Lymphs (Man)  0.0 K/mm3  06/12/20  05:26    


 


Monocytes # (Manual)  1.5 K/mm3 (0.0-0.8)  H  06/12/20  05:26    


 


Eosinophils # (Manual)  0.4 K/mm3 (0.0-0.4)   06/12/20  05:26    


 


Basophils # (Manual)  0.0 K/mm3 (0.0-0.1)   06/12/20  05:26    


 


Metamyelocytes #  0.0 K/mm3  06/12/20  05:26    


 


Myelocytes #  0.0 K/mm3  06/12/20  05:26    


 


Promyelocytes #  0.0 K/mm3  06/12/20  05:26    


 


Blast Cells #  0.0 K/mm3  06/12/20  05:26    


 


WBC Morphology  Not Reportable   06/12/20  05:26    


 


Hypersegmented Neuts  Not Reportable   06/12/20  05:26    


 


Hyposegmented Neuts  Not Reportable   06/12/20  05:26    


 


Hypogranular Neuts  Not Reportable   06/12/20  05:26    


 


Smudge Cells  Not Reportable   06/12/20  05:26    


 


Toxic Granulation  Not Reportable   06/12/20  05:26    


 


Toxic Vacuolation  Not Reportable   06/12/20  05:26    


 


Dohle Bodies  Not Reportable   06/12/20  05:26    


 


Pelger-Huet Anomaly  Not Reportable   06/12/20  05:26    


 


Pepito Rods  Not Reportable   06/12/20  05:26    


 


Platelet Estimate  Consistent w auto   06/12/20  05:26    


 


Clumped Platelets  Not Reportable   06/12/20  05:26    


 


Plt Clumps, EDTA  Not Reportable   06/12/20  05:26    


 


Large Platelets  Not Reportable   06/12/20  05:26    


 


Giant Platelets  Not Reportable   06/12/20  05:26    


 


Platelet Satelliting  Not Reportable   06/12/20  05:26    


 


Plt Morphology Comment  Not Reportable   06/12/20  05:26    


 


RBC Morphology  Not Reportable   06/12/20  05:26    


 


Dimorphic RBCs  Not Reportable   06/12/20  05:26    


 


Polychromasia  Not Reportable   06/12/20  05:26    


 


Hypochromasia  Not Reportable   06/12/20  05:26    


 


Poikilocytosis  1+   06/12/20  05:26    


 


Anisocytosis  1+   06/12/20  05:26    


 


Microcytosis  Not Reportable   06/12/20  05:26    


 


Macrocytosis  Not Reportable   06/12/20  05:26    


 


Spherocytes  Not Reportable   06/12/20  05:26    


 


Pappenheimer Bodies  Not Reportable   06/12/20  05:26    


 


Sickle Cells  Not Reportable   06/12/20  05:26    


 


Target Cells  Not Reportable   06/12/20  05:26    


 


Tear Drop Cells  Not Reportable   06/12/20  05:26    


 


Ovalocytes  Not Reportable   06/12/20  05:26    


 


Helmet Cells  Not Reportable   06/12/20  05:26    


 


[place]se-[place] Bodies  Not Reportable   06/12/20  05:26    


 


Cabot Rings  Not Reportable   06/12/20  05:26    


 


[place]itivo Cells  1+   06/12/20  05:26    


 


Bite Cells  Not Reportable   06/12/20  05:26    


 


Crenated Cell  Not Reportable   06/12/20  05:26    


 


Elliptocytes  Not Reportable   06/12/20  05:26    


 


Acanthocytes (Spur)  Few   06/12/20  05:26    


 


Rouleaux  Not Reportable   06/12/20  05:26    


 


Hemoglobin C Crystals  Not Reportable   06/12/20  05:26    


 


Schistocytes  Not Reportable   06/12/20  05:26    


 


Malaria parasites  Not Reportable   06/12/20  05:26    


 


Linus Bodies  Not Reportable   06/12/20  05:26    


 


Hem Pathologist Commnt  No   06/12/20  05:26    


 


PT  17.5 Sec. (12.2-14.9)  H  06/11/20  16:31    


 


INR  1.47  (0.87-1.13)  H  06/11/20  16:31    


 


APTT  25.7 Sec. (24.2-36.6)   06/11/20  16:31    


 


Sodium  139 mmol/L (137-145)   06/13/20  04:42    


 


Potassium  3.0 mmol/L (3.6-5.0)  L  06/13/20  08:02    


 


Chloride  98.7 mmol/L ()   06/13/20  04:42    


 


Carbon Dioxide  25 mmol/L (22-30)   06/13/20  04:42    


 


Anion Gap  18 mmol/L  06/13/20  04:42    


 


BUN  29 mg/dL (9-20)  H  06/13/20  04:42    


 


Creatinine  1.0 mg/dL (0.8-1.5)   06/13/20  04:42    


 


Estimated GFR  > 60 ml/min  06/13/20  04:42    


 


BUN/Creatinine Ratio  29 %  06/13/20  04:42    


 


Glucose  78 mg/dL ()   06/13/20  04:42    


 


Lactic Acid  0.40 mmol/L (0.7-2.0)  L  06/11/20  15:45    


 


Calcium  8.5 mg/dL (8.4-10.2)   06/13/20  04:42    


 


Phosphorus  3.40 mg/dL (2.5-4.5)   06/12/20  05:26    


 


Magnesium  2.10 mg/dL (1.7-2.3)   06/12/20  05:26    


 


Total Bilirubin  0.90 mg/dL (0.1-1.2)   06/12/20  05:26    


 


Direct Bilirubin  0.3 mg/dL (0-0.2)  H  06/11/20  16:31    


 


Indirect Bilirubin  0.6 mg/dL  06/11/20  16:31    


 


AST  20 units/L (5-40)   06/12/20  05:26    


 


ALT  10 units/L (7-56)   06/12/20  05:26    


 


Alkaline Phosphatase  58 units/L ()   06/12/20  05:26    


 


Total Protein  6.4 g/dL (6.3-8.2)   06/12/20  05:26    


 


Albumin  3.4 g/dL (3.9-5)  L  06/12/20  05:26    


 


Albumin/Globulin Ratio  1.1 %  06/12/20  05:26    


 


Nasal Screen MRSA (PCR)  Negative  (Negative)   06/12/20  23:57    











Constnatino/IV: 


                                        





Voiding Method                   Urinal


IV Catheter Type [Left Hand]     Peripheral IV











Active Medications





- Current Medications


Current Medications: 














Generic Name Dose Route Start Last Admin





  Trade Name Freq  PRN Reason Stop Dose Admin


 


Acetaminophen  650 mg  06/11/20 18:53 





  Tylenol  PO  





  Q4H PRN  





  Pain MILD(1-3)/Fever >100.5/HA  


 


Albuterol  2.5 mg  06/11/20 18:53 





  Proventil  IH  





  Q4HRT PRN  





  Shortness Of Breath  


 


Famotidine  20 mg  06/13/20 11:00  06/13/20 11:04





  Pepcid  IV   20 mg





  QDAY LEO   Administration


 


Hydralazine HCl  10 mg  06/11/20 18:55 





  Apresoline  IV  





  Q6HR PRN  





  Hypertension  


 


Potassium Chloride/Dextrose/Sod Cl  20 meq in 1,000 mls @ 100 mls/hr  06/13/20 

09:00  06/13/20 09:01





  D5w/0.45% Nacl/Kcl 20 Meq  IV   100 mls/hr





  AS DIRECT LEO   Administration


 


Morphine Sulfate  2 mg  06/11/20 18:53 





  Morphine  IV  





  Q4H PRN  





  Pain, Moderate (4-6)  


 


Ondansetron HCl  4 mg  06/11/20 18:53 





  Zofran  IV  





  Q8H PRN  





  Nausea And Vomiting  


 


Sodium Chloride  10 ml  06/11/20 22:00  06/13/20 09:05





  Sodium Chloride Flush Syringe 10 Ml  IV   10 ml





  BID LEO   Administration


 


Sodium Chloride  10 ml  06/11/20 18:53 





  Sodium Chloride Flush Syringe 10 Ml  IV  





  PRN PRN  





  LINE FLUSH

## 2020-06-13 NOTE — PROGRESS NOTE
Assessment and Plan





1. Acute kidney injury:


Vasomotor ILEANA in setting of volume depletion.


Previously normal renal function.


CT abdomen was negative for hydro.


Urine studies ordered.


Patient also received IV contrast.


Continue IV fluids.


Creatinine is 1 from 1.7 from 2.3.


Monitor renal function.


Avoid nephrotoxic agents.


Meds dosage based on GFR. 





2. FEN:


Hypokalemia, replete K, monitor.


Monitor lytes and volume status.





3. SBO (small bowel obstruction):


Followed by General Surgery.


NG tube decompression.


IV fluids.





4. Hypertension:


Follow BP.





5. H/o AAA repair.











Objective:


Patient was seen and examined at the bedside.


Doing better.








Examination:


General appearance: well-developed, appears stated age, no distress, NGT


HEENT: atraumatic


Neck: neck supple, trachea midline


Respiratory: ctab


Heart: irrregular, normal heart rate, S1S2, no murmur


Gastrointestinal: soft, bowel sounds present, not tender, L LQ mass noted


Integumentary: no rash, warm and dry


Neurologic: non-focal, AOX4


Ext: no edema











Subjective


Date of service: 06/13/20





Objective





- Vital Signs


Vital signs: 


                               Vital Signs - 12hr











  06/13/20 06/13/20 06/13/20





  00:04 07:15 08:14


 


Temperature 98.7 F 98.8 F 


 


Pulse Rate 99 H 91 H 


 


Respiratory 17 18 





Rate   


 


Blood Pressure 143/102 132/86 


 


O2 Sat by Pulse 97 97 96





Oximetry   














- Lab





                                 06/12/20 05:26





                                 06/13/20 08:02


                             Most recent lab results











Calcium  8.5 mg/dL (8.4-10.2)   06/13/20  04:42    


 


Phosphorus  3.40 mg/dL (2.5-4.5)   06/12/20  05:26    


 


Magnesium  2.10 mg/dL (1.7-2.3)   06/12/20  05:26    














Medications & Allergies





- Medications


Allergies/Adverse Reactions: 


                                    Allergies





No Known Allergies Allergy (Unverified 11/10/18 09:51)


   








Home Medications: 


                                Home Medications











 Medication  Instructions  Recorded  Confirmed  Last Taken  Type


 


AtorvaSTATin 40 mg PO QHS 06/11/20 06/11/20 Unknown History


 


Metoprolol Succinate 50 mg PO BID 06/11/20 06/11/20 Unknown History











Active Medications: 














Generic Name Dose Route Start Last Admin





  Trade Name Freq  PRN Reason Stop Dose Admin


 


Acetaminophen  650 mg  06/11/20 18:53 





  Tylenol  PO  





  Q4H PRN  





  Pain MILD(1-3)/Fever >100.5/HA  


 


Albuterol  2.5 mg  06/11/20 18:53 





  Proventil  IH  





  Q4HRT PRN  





  Shortness Of Breath  


 


Hydralazine HCl  10 mg  06/11/20 18:55 





  Apresoline  IV  





  Q6HR PRN  





  Hypertension  


 


Sodium Chloride  1,000 mls @ 100 mls/hr  06/11/20 19:00  06/13/20 07:34





  Nacl 0.9% 1000 Ml  IV   100 mls/hr





  AS DIRECT LEO   Administration


 


Morphine Sulfate  2 mg  06/11/20 18:53 





  Morphine  IV  





  Q4H PRN  





  Pain, Moderate (4-6)  


 


Ondansetron HCl  4 mg  06/11/20 18:53 





  Zofran  IV  





  Q8H PRN  





  Nausea And Vomiting  


 


Sodium Chloride  10 ml  06/11/20 22:00  06/12/20 21:27





  Sodium Chloride Flush Syringe 10 Ml  IV   10 ml





  BID LEO   Administration


 


Sodium Chloride  10 ml  06/11/20 18:53 





  Sodium Chloride Flush Syringe 10 Ml  IV  





  PRN PRN  





  LINE FLUSH

## 2020-06-13 NOTE — PROGRESS NOTE
Assessment and Plan





- Patient Problems


(1) SBO (small bowel obstruction)


Current Visit: Yes   Status: Acute   


Plan to address problem: 


CT scan abdomen and pelvis results reviewed, surgical team consulted in ED, NG 

tube for gastric decompression, serial abdominal exam, bowel rest, IV fluid 

resuscitation, supportive care.








(2) HTN (hypertension)


Current Visit: No   Status: Chronic   


Qualifiers: 


   Hypertension type: essential hypertension   Qualified Code(s): I10 - 

Essential (primary) hypertension   


Plan to address problem: 


Monitor blood pressure every shift, pain control, supportive care.








(3) DVT prophylaxis


Current Visit: No   Status: Acute   


Plan to address problem: 


SCD to bilateral lower extremities while in bed, patient is ambulatory.








(4) Advance care planning


Current Visit: Yes   Status: Acute   





History


Interval history: 





64 YO Male HD#2 with Partial SBO.  NG tube in place.  Patient knowledges flatus,

patient denies fever, chills, chest pain, palpitations, bright red blood per 

rectum,.  No reported nursing events.





Hospitalist Physical





- Constitutional


Vitals: 


                                        











Temp Pulse Resp BP Pulse Ox


 


 98.6 F   88   18   120/86   94 


 


 06/13/20 11:08  06/13/20 11:08  06/13/20 11:08  06/13/20 11:08  06/13/20 11:08











General appearance: Present: mild distress





- EENT


Eyes: Present: PERRL, EOM intact


ENT: hearing intact





- Neck


Neck: Present: supple





- Respiratory


Respiratory effort: normal


Respiratory: bilateral: CTA





- Cardiovascular


Rhythm: regular


Heart Sounds: Present: S1 & S2





- Extremities


Extremities: no ischemia


Peripheral Pulses: within normal limits





- Abdominal


General gastrointestinal: soft, non-tender, non-distended





- Integumentary


Integumentary: Present: clear, warm, dry





- Psychiatric


Psychiatric: appropriate mood/affect, cooperative





- Neurologic


Neurologic: CNII-XII intact





Results





- Labs


CBC & Chem 7: 


                                 06/12/20 05:26





                                 06/13/20 08:02


Labs: 


                             Laboratory Last Values











WBC  7.5 K/mm3 (4.5-11.0)   06/12/20  05:26    


 


RBC  4.49 M/mm3 (3.65-5.03)   06/12/20  05:26    


 


Hgb  14.9 gm/dl (11.8-15.2)   06/12/20  05:26    


 


Hct  43.5 % (35.5-45.6)   06/12/20  05:26    


 


MCV  97 fl (84-94)  H  06/12/20  05:26    


 


MCH  33 pg (28-32)  H  06/12/20  05:26    


 


MCHC  34 % (32-34)   06/12/20  05:26    


 


RDW  13.7 % (13.2-15.2)   06/12/20  05:26    


 


Plt Count  122 K/mm3 (140-440)  L  06/12/20  05:26    


 


Mono % (Auto)  Np   06/12/20  05:26    


 


Add Manual Diff  Complete   06/12/20  05:26    


 


Total Counted  100   06/12/20  05:26    


 


Seg Neuts % (Manual)  63.0 % (40.0-70.0)   06/12/20  05:26    


 


Band Neutrophils %  0 %  06/12/20  05:26    


 


Lymphocytes % (Manual)  12.0 % (13.4-35.0)  L  06/12/20  05:26    


 


Reactive Lymphs % (Man)  0 %  06/12/20  05:26    


 


Monocytes % (Manual)  20.0 % (0.0-7.3)  H  06/12/20  05:26    


 


Eosinophils % (Manual)  5.0 % (0.0-4.3)  H  06/12/20  05:26    


 


Basophils % (Manual)  0 % (0.0-1.8)   06/12/20  05:26    


 


Metamyelocytes %  0 %  06/12/20  05:26    


 


Myelocytes %  0 %  06/12/20  05:26    


 


Promyelocytes %  0 %  06/12/20  05:26    


 


Blast Cells %  0 %  06/12/20  05:26    


 


Nucleated RBC %  Not Reportable   06/12/20  05:26    


 


Seg Neutrophils # Man  4.7 K/mm3 (1.8-7.7)   06/12/20  05:26    


 


Band Neutrophils #  0.0 K/mm3  06/12/20  05:26    


 


Lymphocytes # (Manual)  0.9 K/mm3 (1.2-5.4)  L  06/12/20  05:26    


 


Abs React Lymphs (Man)  0.0 K/mm3  06/12/20  05:26    


 


Monocytes # (Manual)  1.5 K/mm3 (0.0-0.8)  H  06/12/20  05:26    


 


Eosinophils # (Manual)  0.4 K/mm3 (0.0-0.4)   06/12/20  05:26    


 


Basophils # (Manual)  0.0 K/mm3 (0.0-0.1)   06/12/20  05:26    


 


Metamyelocytes #  0.0 K/mm3  06/12/20  05:26    


 


Myelocytes #  0.0 K/mm3  06/12/20  05:26    


 


Promyelocytes #  0.0 K/mm3  06/12/20  05:26    


 


Blast Cells #  0.0 K/mm3  06/12/20  05:26    


 


WBC Morphology  Not Reportable   06/12/20  05:26    


 


Hypersegmented Neuts  Not Reportable   06/12/20  05:26    


 


Hyposegmented Neuts  Not Reportable   06/12/20  05:26    


 


Hypogranular Neuts  Not Reportable   06/12/20  05:26    


 


Smudge Cells  Not Reportable   06/12/20  05:26    


 


Toxic Granulation  Not Reportable   06/12/20  05:26    


 


Toxic Vacuolation  Not Reportable   06/12/20  05:26    


 


Dohle Bodies  Not Reportable   06/12/20  05:26    


 


Pelger-Huet Anomaly  Not Reportable   06/12/20  05:26    


 


Pepito Rods  Not Reportable   06/12/20  05:26    


 


Platelet Estimate  Consistent w auto   06/12/20  05:26    


 


Clumped Platelets  Not Reportable   06/12/20  05:26    


 


Plt Clumps, EDTA  Not Reportable   06/12/20  05:26    


 


Large Platelets  Not Reportable   06/12/20  05:26    


 


Giant Platelets  Not Reportable   06/12/20  05:26    


 


Platelet Satelliting  Not Reportable   06/12/20  05:26    


 


Plt Morphology Comment  Not Reportable   06/12/20  05:26    


 


RBC Morphology  Not Reportable   06/12/20  05:26    


 


Dimorphic RBCs  Not Reportable   06/12/20  05:26    


 


Polychromasia  Not Reportable   06/12/20  05:26    


 


Hypochromasia  Not Reportable   06/12/20  05:26    


 


Poikilocytosis  1+   06/12/20  05:26    


 


Anisocytosis  1+   06/12/20  05:26    


 


Microcytosis  Not Reportable   06/12/20  05:26    


 


Macrocytosis  Not Reportable   06/12/20  05:26    


 


Spherocytes  Not Reportable   06/12/20  05:26    


 


Pappenheimer Bodies  Not Reportable   06/12/20  05:26    


 


Sickle Cells  Not Reportable   06/12/20  05:26    


 


Target Cells  Not Reportable   06/12/20  05:26    


 


Tear Drop Cells  Not Reportable   06/12/20  05:26    


 


Ovalocytes  Not Reportable   06/12/20  05:26    


 


Helmet Cells  Not Reportable   06/12/20  05:26    


 


Tse-[place] Bodies  Not Reportable   06/12/20  05:26    


 


Cabot Rings  Not Reportable   06/12/20  05:26    


 


[place] Cells  1+   06/12/20  05:26    


 


Bite Cells  Not Reportable   06/12/20  05:26    


 


Crenated Cell  Not Reportable   06/12/20  05:26    


 


Elliptocytes  Not Reportable   06/12/20  05:26    


 


Acanthocytes (Spur)  Few   06/12/20  05:26    


 


Rouleaux  Not Reportable   06/12/20  05:26    


 


Hemoglobin C Crystals  Not Reportable   06/12/20  05:26    


 


Schistocytes  Not Reportable   06/12/20  05:26    


 


Malaria parasites  Not Reportable   06/12/20  05:26    


 


Linus Bodies  Not Reportable   06/12/20  05:26    


 


Hem Pathologist Commnt  No   06/12/20  05:26    


 


PT  17.5 Sec. (12.2-14.9)  H  06/11/20  16:31    


 


INR  1.47  (0.87-1.13)  H  06/11/20  16:31    


 


APTT  25.7 Sec. (24.2-36.6)   06/11/20  16:31    


 


Sodium  139 mmol/L (137-145)   06/13/20  04:42    


 


Potassium  3.0 mmol/L (3.6-5.0)  L  06/13/20  08:02    


 


Chloride  98.7 mmol/L ()   06/13/20  04:42    


 


Carbon Dioxide  25 mmol/L (22-30)   06/13/20  04:42    


 


Anion Gap  18 mmol/L  06/13/20  04:42    


 


BUN  29 mg/dL (9-20)  H  06/13/20  04:42    


 


Creatinine  1.0 mg/dL (0.8-1.5)   06/13/20  04:42    


 


Estimated GFR  > 60 ml/min  06/13/20  04:42    


 


BUN/Creatinine Ratio  29 %  06/13/20  04:42    


 


Glucose  78 mg/dL ()   06/13/20  04:42    


 


Lactic Acid  0.40 mmol/L (0.7-2.0)  L  06/11/20  15:45    


 


Calcium  8.5 mg/dL (8.4-10.2)   06/13/20  04:42    


 


Phosphorus  3.40 mg/dL (2.5-4.5)   06/12/20  05:26    


 


Magnesium  2.10 mg/dL (1.7-2.3)   06/12/20  05:26    


 


Total Bilirubin  0.90 mg/dL (0.1-1.2)   06/12/20  05:26    


 


Direct Bilirubin  0.3 mg/dL (0-0.2)  H  06/11/20  16:31    


 


Indirect Bilirubin  0.6 mg/dL  06/11/20  16:31    


 


AST  20 units/L (5-40)   06/12/20  05:26    


 


ALT  10 units/L (7-56)   06/12/20  05:26    


 


Alkaline Phosphatase  58 units/L ()   06/12/20  05:26    


 


Total Protein  6.4 g/dL (6.3-8.2)   06/12/20  05:26    


 


Albumin  3.4 g/dL (3.9-5)  L  06/12/20  05:26    


 


Albumin/Globulin Ratio  1.1 %  06/12/20  05:26    


 


Nasal Screen MRSA (PCR)  Negative  (Negative)   06/12/20  23:57    











Constantino/IV: 


                                        





Voiding Method                   Urinal


IV Catheter Type [Left Hand]     Peripheral IV











Active Medications





- Current Medications


Current Medications: 














Generic Name Dose Route Start Last Admin





  Trade Name Freq  PRN Reason Stop Dose Admin


 


Acetaminophen  650 mg  06/11/20 18:53 





  Tylenol  PO  





  Q4H PRN  





  Pain MILD(1-3)/Fever >100.5/HA  


 


Albuterol  2.5 mg  06/11/20 18:53 





  Proventil  IH  





  Q4HRT PRN  





  Shortness Of Breath  


 


Famotidine  20 mg  06/13/20 11:00  06/13/20 11:04





  Pepcid  IV   20 mg





  QDAY LEO   Administration


 


Hydralazine HCl  10 mg  06/11/20 18:55 





  Apresoline  IV  





  Q6HR PRN  





  Hypertension  


 


Potassium Chloride  10 meq in 100 mls @ 100 mls/hr  06/13/20 10:00  06/13/20 

11:00





  Kcl 10meq/100ml  IV  06/13/20 15:59  100 mls/hr





  Q1H LEO   Administration


 


Potassium Chloride/Dextrose/Sod Cl  20 meq in 1,000 mls @ 100 mls/hr  06/13/20 

09:00  06/13/20 09:01





  D5w/0.45% Nacl/Kcl 20 Meq  IV   100 mls/hr





  AS DIRECT LEO   Administration


 


Morphine Sulfate  2 mg  06/11/20 18:53 





  Morphine  IV  





  Q4H PRN  





  Pain, Moderate (4-6)  


 


Ondansetron HCl  4 mg  06/11/20 18:53 





  Zofran  IV  





  Q8H PRN  





  Nausea And Vomiting  


 


Sodium Chloride  10 ml  06/11/20 22:00  06/13/20 09:05





  Sodium Chloride Flush Syringe 10 Ml  IV   10 ml





  BID LEO   Administration


 


Sodium Chloride  10 ml  06/11/20 18:53 





  Sodium Chloride Flush Syringe 10 Ml  IV  





  PRN PRN  





  LINE FLUSH

## 2020-06-14 VITALS — SYSTOLIC BLOOD PRESSURE: 127 MMHG | DIASTOLIC BLOOD PRESSURE: 94 MMHG

## 2020-06-14 LAB
BUN SERPL-MCNC: 17 MG/DL (ref 9–20)
BUN/CREAT SERPL: 19 %
CALCIUM SERPL-MCNC: 8.1 MG/DL (ref 8.4–10.2)
HEMOLYSIS INDEX: 6

## 2020-06-14 RX ADMIN — Medication SCH: at 10:07

## 2020-06-14 RX ADMIN — POTASSIUM & SODIUM PHOSPHATES POWDER PACK 280-160-250 MG SCH EACH: 280-160-250 PACK at 10:19

## 2020-06-14 RX ADMIN — POTASSIUM & SODIUM PHOSPHATES POWDER PACK 280-160-250 MG SCH EACH: 280-160-250 PACK at 15:04

## 2020-06-14 RX ADMIN — FAMOTIDINE SCH: 10 INJECTION, SOLUTION INTRAVENOUS at 10:06

## 2020-06-14 RX ADMIN — FAMOTIDINE SCH MG: 10 INJECTION, SOLUTION INTRAVENOUS at 07:34

## 2020-06-14 NOTE — DISCHARGE SUMMARY
Providers





- Providers


Date of Admission: 


06/11/20 18:53





Attending physician: 


ROSI TOMPKINS





                                        





06/11/20 16:06


Consult to Physician [CONS] Urgent 


   Comment: 


   Consulting Provider: SLICK LINCOLN


   Physician Instructions: 


   Reason For Exam: sbo





06/11/20 17:11


Consult to Physician [CONS] Urgent 


   Comment: 


   Consulting Provider: CAMRON GUTIÉRREZ


   Physician Instructions: 


   Reason For Exam: ileana











Primary care physician: 


PRIMARY CARE MD








Hospitalization


Condition: Serious


Hospital course: 


66 YO Male with HTN, AAA S/P Repair, Chronic Constipation presents to ED for 

evaluation.  Patient stated that he had experienced abdominal pain over the 

previous 3 days.  Patient stated that his pain was 10/10, crampy, associated 

with nausea and multiple episodes of vomiting.  Patient underwent a CT scan of 

the abdomen and pelvis as an outpatient on yesterday and presented to his 

primary care physician's office today to review results.  Patient was seen and 

evaluated by his primary care physician and instructed to seek further care.  

EMS notified and the patient was subsequently transported to [facility] for further 

evaluation and care.  Patient seen and evaluated in the emergency department.  

Lab and imaging studies were reviewed.  Patient found to have evidence of a 

small bowel obstruction as well as ILEANA with ATN.  Surgical team consulted in ED 

as well as Nephrology team.  Patient underwent placement of an NG tube for 

gastric decompression.  Patient admitted to surgical floor due to increased risk

 of decompensation.  Patient denied fever, chills, chest pain, palpitation, 

productive cough, skin rash, recent ill contact, hematemesis, bright red blood 

per rectum, melena, ingestion of food/water from new or different sources.  

Prior admission on 11/10/2018 reviewed.  Advanced care planning conducted in the

 emergency department.  Patient treated with IV fluid resuscitation therapy, 

bowel rest and supportive care.  Patient convalesced well during hospital course

 with mild daily improvement in the aforementioned symptoms.  Patient NG tube 

was clamped on the day prior to admission without return of the aforementioned 

symptoms.  Patient NG tube was discontinued.  Patient is tolerating diet, 

acknowledges flatus and bowel movement.  On day of discharge the patient is 

medically optimized and back to usual state of health.  The patient is cleared 

for discharge as per surgical team.  Patient seen and evaluated prior to 

discharge but no significant physical exam findings on exam.  Patient discharged

 home and instructed to follow-up with primary care physician within 1 week and 

follow-up with surgical team PRN.  35 minutes dedicated to patient discharge and

 coordination of care.








Disposition: DC-01 TO HOME OR SELFCARE





- Discharge Diagnoses


(1) SBO (small bowel obstruction)


Status: Acute   





(2) HTN (hypertension)


Status: Chronic   


Qualifiers: 


   Hypertension type: essential hypertension   Qualified Code(s): I10 - 

Essential (primary) hypertension   





(3) DVT prophylaxis


Status: Acute   





(4) Advance care planning


Status: Acute   





Core Measure Documentation





- Palliative Care


Palliative Care/ Comfort Measures: Not Applicable





- Core Measures


Any of the following diagnoses?: none





Exam





- Constitutional


Vitals: 


                                        











Temp Pulse Resp BP Pulse Ox


 


 98.8 F   81   16   127/94   96 


 


 06/14/20 11:16  06/14/20 11:16  06/14/20 11:16  06/14/20 11:16  06/14/20 11:16











General appearance: Present: no acute distress, well-nourished





- EENT


Eyes: Present: PERRL


ENT: hearing intact, clear oral mucosa





- Neck


Neck: Present: supple, normal ROM





- Respiratory


Respiratory effort: normal


Respiratory: bilateral: CTA





- Cardiovascular


Heart Sounds: Present: S1 & S2.  Absent: rub, click





- Extremities


Extremities: pulses symmetrical, No edema


Peripheral Pulses: within normal limits





- Abdominal


General gastrointestinal: Present: soft, non-tender, non-distended, normal bowel

 sounds


Male genitourinary: Present: normal





- Integumentary


Integumentary: Present: clear, warm, dry





- Musculoskeletal


Musculoskeletal: gait normal, strength equal bilaterally





- Psychiatric


Psychiatric: appropriate mood/affect, intact judgment & insight





- Neurologic


Neurologic: CNII-XII intact, moves all extremities





Plan


Activity: advance as tolerated


Diet: regular


Follow up with: 


PRIMARY CARE,MD [Primary Care Provider] - 3-5 Days

## 2020-06-14 NOTE — PROGRESS NOTE
Assessment and Plan





1. Acute kidney injury:


Vasomotor ILEANA in setting of volume depletion.


Previously normal renal function.


CT abdomen was negative for hydro.


Patient also received IV contrast.


Continue IV fluids.


Creatinine is 0.9 from 1 from 1.7 from 2.3.


Monitor renal function.


Avoid nephrotoxic agents.


Meds dosage based on GFR. 





2. FEN:


Hypokalemia, replete K, monitor.


Replete Phos.


Monitor lytes and volume status.





3. SBO (small bowel obstruction):


Followed by General Surgery.


S/p NG tube decompression.


IV fluids.





4. Hypertension:


Follow BP.





5. H/o AAA repair.











Objective:


Patient was seen and examined at the bedside.


Doing better.








Examination:


General appearance: well-developed, appears stated age, no distress


HEENT: atraumatic


Neck: neck supple, trachea midline


Respiratory: ctab


Heart: irrregular, normal heart rate, S1S2, no murmur


Gastrointestinal: soft, bowel sounds present, not tender, L LQ mass noted


Integumentary: no rash, warm and dry


Neurologic: non-focal, AOX4


Ext: no edema








Subjective


Date of service: 06/14/20





Objective





- Vital Signs


Vital signs: 


                               Vital Signs - 12hr











  06/13/20 06/13/20 06/14/20





  19:53 20:00 00:12


 


Temperature 100.0 F H  99.9 F H


 


Pulse Rate 92 H  86


 


Respiratory 18 20 18





Rate   


 


Blood Pressure 124/91  128/97


 


Blood Pressure   





[Left]   


 


O2 Sat by Pulse 98  96





Oximetry   














  06/14/20 06/14/20 06/14/20





  04:19 07:09 07:28


 


Temperature 98.9 F 98.5 F 98.5 F


 


Pulse Rate 84  83


 


Respiratory 18 20 20





Rate   


 


Blood Pressure 117/85 126/84 


 


Blood Pressure   126/84





[Left]   


 


O2 Sat by Pulse 97  97





Oximetry   














- Lab





                                 06/12/20 05:26





                                 06/14/20 04:28


                             Most recent lab results











Calcium  8.1 mg/dL (8.4-10.2)  L  06/14/20  04:28    


 


Phosphorus  1.40 mg/dL (2.5-4.5)  L  06/14/20  04:28    


 


Magnesium  2.10 mg/dL (1.7-2.3)   06/12/20  05:26    














Medications & Allergies





- Medications


Allergies/Adverse Reactions: 


                                    Allergies





No Known Allergies Allergy (Unverified 11/10/18 09:51)


   








Home Medications: 


                                Home Medications











 Medication  Instructions  Recorded  Confirmed  Last Taken  Type


 


AtorvaSTATin 40 mg PO QHS 06/11/20 06/11/20 Unknown History


 


Metoprolol Succinate 50 mg PO BID 06/11/20 06/11/20 Unknown History











Active Medications: 














Generic Name Dose Route Start Last Admin





  Trade Name Freq  PRN Reason Stop Dose Admin


 


Acetaminophen  650 mg  06/11/20 18:53 





  Tylenol  PO  





  Q4H PRN  





  Pain MILD(1-3)/Fever >100.5/HA  


 


Albuterol  2.5 mg  06/11/20 18:53 





  Proventil  IH  





  Q4HRT PRN  





  Shortness Of Breath  


 


Famotidine  20 mg  06/13/20 11:00  06/14/20 07:34





  Pepcid  IV   20 mg





  QDAY LEO   Administration


 


Hydralazine HCl  10 mg  06/11/20 18:55 





  Apresoline  IV  





  Q6HR PRN  





  Hypertension  


 


Potassium Chloride/Dextrose/Sod Cl  20 meq in 1,000 mls @ 100 mls/hr  06/13/20 

09:00  06/13/20 09:01





  D5w/0.45% Nacl/Kcl 20 Meq  IV   100 mls/hr





  AS DIRECT LEO   Administration


 


Potassium Chloride  10 meq in 100 mls @ 100 mls/hr  06/14/20 08:00 





  Kcl 10meq/100ml  IV  06/14/20 13:59 





  Q1H LEO  


 


Potassium Phosphate 45 mmol/  515 mls @ 85 mls/hr  06/14/20 07:42 





  Sodium Chloride  IV  06/14/20 13:45 





  ONCE ONE  


 


Morphine Sulfate  2 mg  06/11/20 18:53 





  Morphine  IV  





  Q4H PRN  





  Pain, Moderate (4-6)  


 


Ondansetron HCl  4 mg  06/11/20 18:53 





  Zofran  IV  





  Q8H PRN  





  Nausea And Vomiting  


 


Sodium Chloride  10 ml  06/11/20 22:00  06/13/20 22:11





  Sodium Chloride Flush Syringe 10 Ml  IV   10 ml





  BID LEO   Administration


 


Sodium Chloride  10 ml  06/11/20 18:53 





  Sodium Chloride Flush Syringe 10 Ml  IV  





  PRN PRN  





  LINE FLUSH

## 2020-06-14 NOTE — XRAY REPORT
ABDOMEN 1 VIEW(S)



INDICATION / CLINICAL INFORMATION:

f/u on SBO status.



COMPARISON: 

Abdominal radiograph one day prior



FINDINGS:



TUBES / LINES: None.

BOWEL GAS PATTERN: There are several mildly dilated loops of small bowel, the largest in the right up
per quadrant measuring up to 3.8 cm in diameter. This appears minimally increased from prior examinat
ion. 

FREE AIR / EXTRALUMINAL GAS: None appreciated on supine examination.



ADDITIONAL FINDINGS: Surgical clips in the left mid abdomen, and an anastomotic suture line seen in t
he left lower quadrant.



IMPRESSION:

1. Several mildly dilated loops of small bowel, slightly increased from prior examination.



Signer Name: Raquel Villagran MD 

Signed: 6/14/2020 8:33 AM

Workstation Name: [id number]

## 2021-06-19 ENCOUNTER — HOSPITAL ENCOUNTER (INPATIENT)
Dept: HOSPITAL 5 - ED | Age: 66
LOS: 9 days | Discharge: HOME | DRG: 335 | End: 2021-06-28
Attending: INTERNAL MEDICINE | Admitting: INTERNAL MEDICINE
Payer: MEDICARE

## 2021-06-19 DIAGNOSIS — I10: ICD-10-CM

## 2021-06-19 DIAGNOSIS — E78.2: ICD-10-CM

## 2021-06-19 DIAGNOSIS — K21.9: ICD-10-CM

## 2021-06-19 DIAGNOSIS — K56.50: Primary | ICD-10-CM

## 2021-06-19 DIAGNOSIS — E87.6: ICD-10-CM

## 2021-06-19 DIAGNOSIS — I71.4: ICD-10-CM

## 2021-06-19 DIAGNOSIS — I48.0: ICD-10-CM

## 2021-06-19 DIAGNOSIS — E86.0: ICD-10-CM

## 2021-06-19 DIAGNOSIS — N17.0: ICD-10-CM

## 2021-06-19 DIAGNOSIS — E78.00: ICD-10-CM

## 2021-06-19 DIAGNOSIS — I95.9: ICD-10-CM

## 2021-06-19 DIAGNOSIS — I73.9: ICD-10-CM

## 2021-06-19 LAB
ALBUMIN SERPL-MCNC: 4.3 G/DL (ref 3.9–5)
ALT SERPL-CCNC: 9 UNITS/L (ref 7–56)
BAND NEUTROPHILS # (MANUAL): 0 K/MM3
BILIRUB DIRECT SERPL-MCNC: 0.2 MG/DL (ref 0–0.2)
BILIRUB UR QL STRIP: (no result)
BLOOD UR QL VISUAL: (no result)
BUN SERPL-MCNC: 70 MG/DL (ref 9–20)
BUN/CREAT SERPL: 21 %
CALCIUM SERPL-MCNC: 9.1 MG/DL (ref 8.4–10.2)
CREATININE,URINE: 198.5 MG/DL (ref 0.1–20)
HCT VFR BLD CALC: 42.5 % (ref 35.5–45.6)
HEMOLYSIS INDEX: 6
HGB BLD-MCNC: 15 GM/DL (ref 11.8–15.2)
HYALINE CASTS #/AREA URNS LPF: 23 /LPF
MCHC RBC AUTO-ENTMCNC: 35 % (ref 32–34)
MCV RBC AUTO: 97 FL (ref 84–94)
MUCOUS THREADS #/AREA URNS HPF: (no result) /HPF
MYELOCYTES # (MANUAL): 0 K/MM3
PH UR STRIP: 5 [PH] (ref 5–7)
PLATELET # BLD: 210 K/MM3 (ref 140–440)
PROMYELOCYTES # (MANUAL): 0 K/MM3
PROTEIN/CREATININE RATIO,URINE: 0.19
RBC # BLD AUTO: 4.4 M/MM3 (ref 3.65–5.03)
RBC #/AREA URNS HPF: 10 /HPF (ref 0–6)
TOTAL CELLS COUNTED BLD: 100
UROBILINOGEN UR-MCNC: 2 MG/DL (ref ?–2)
WBC #/AREA URNS HPF: 9 /HPF (ref 0–6)

## 2021-06-19 PROCEDURE — 84300 ASSAY OF URINE SODIUM: CPT

## 2021-06-19 PROCEDURE — 84484 ASSAY OF TROPONIN QUANT: CPT

## 2021-06-19 PROCEDURE — 86850 RBC ANTIBODY SCREEN: CPT

## 2021-06-19 PROCEDURE — 74019 RADEX ABDOMEN 2 VIEWS: CPT

## 2021-06-19 PROCEDURE — 84100 ASSAY OF PHOSPHORUS: CPT

## 2021-06-19 PROCEDURE — 82565 ASSAY OF CREATININE: CPT

## 2021-06-19 PROCEDURE — 80076 HEPATIC FUNCTION PANEL: CPT

## 2021-06-19 PROCEDURE — 83735 ASSAY OF MAGNESIUM: CPT

## 2021-06-19 PROCEDURE — 85730 THROMBOPLASTIN TIME PARTIAL: CPT

## 2021-06-19 PROCEDURE — 80048 BASIC METABOLIC PNL TOTAL CA: CPT

## 2021-06-19 PROCEDURE — 74176 CT ABD & PELVIS W/O CONTRAST: CPT

## 2021-06-19 PROCEDURE — 82043 UR ALBUMIN QUANTITATIVE: CPT

## 2021-06-19 PROCEDURE — 86901 BLOOD TYPING SEROLOGIC RH(D): CPT

## 2021-06-19 PROCEDURE — 85007 BL SMEAR W/DIFF WBC COUNT: CPT

## 2021-06-19 PROCEDURE — 85025 COMPLETE CBC W/AUTO DIFF WBC: CPT

## 2021-06-19 PROCEDURE — 93306 TTE W/DOPPLER COMPLETE: CPT

## 2021-06-19 PROCEDURE — 85610 PROTHROMBIN TIME: CPT

## 2021-06-19 PROCEDURE — 83690 ASSAY OF LIPASE: CPT

## 2021-06-19 PROCEDURE — 89050 BODY FLUID CELL COUNT: CPT

## 2021-06-19 PROCEDURE — 82570 ASSAY OF URINE CREATININE: CPT

## 2021-06-19 PROCEDURE — 87086 URINE CULTURE/COLONY COUNT: CPT

## 2021-06-19 PROCEDURE — 84156 ASSAY OF PROTEIN URINE: CPT

## 2021-06-19 PROCEDURE — 86900 BLOOD TYPING SEROLOGIC ABO: CPT

## 2021-06-19 PROCEDURE — 71250 CT THORAX DX C-: CPT

## 2021-06-19 PROCEDURE — 85027 COMPLETE CBC AUTOMATED: CPT

## 2021-06-19 PROCEDURE — 0D9670Z DRAINAGE OF STOMACH WITH DRAINAGE DEVICE, VIA NATURAL OR ARTIFICIAL OPENING: ICD-10-PCS | Performed by: INTERNAL MEDICINE

## 2021-06-19 PROCEDURE — 93005 ELECTROCARDIOGRAM TRACING: CPT

## 2021-06-19 PROCEDURE — 96375 TX/PRO/DX INJ NEW DRUG ADDON: CPT

## 2021-06-19 PROCEDURE — 36415 COLL VENOUS BLD VENIPUNCTURE: CPT

## 2021-06-19 PROCEDURE — 82962 GLUCOSE BLOOD TEST: CPT

## 2021-06-19 PROCEDURE — 81001 URINALYSIS AUTO W/SCOPE: CPT

## 2021-06-19 PROCEDURE — 96374 THER/PROPH/DIAG INJ IV PUSH: CPT

## 2021-06-19 RX ADMIN — MORPHINE SULFATE PRN MG: 2 INJECTION, SOLUTION INTRAMUSCULAR; INTRAVENOUS at 11:56

## 2021-06-19 RX ADMIN — POTASSIUM CHLORIDE SCH MLS/HR: 10 INJECTION, SOLUTION INTRAVENOUS at 20:31

## 2021-06-19 RX ADMIN — ONDANSETRON PRN MG: 2 INJECTION INTRAMUSCULAR; INTRAVENOUS at 18:37

## 2021-06-19 RX ADMIN — SODIUM CHLORIDE SCH MLS/HR: 0.9 INJECTION, SOLUTION INTRAVENOUS at 14:36

## 2021-06-19 RX ADMIN — POTASSIUM CHLORIDE SCH MLS/HR: 10 INJECTION, SOLUTION INTRAVENOUS at 17:27

## 2021-06-19 RX ADMIN — SODIUM CHLORIDE SCH MLS/HR: 0.9 INJECTION, SOLUTION INTRAVENOUS at 11:56

## 2021-06-19 RX ADMIN — CEFTRIAXONE SODIUM SCH MLS/HR: 1 INJECTION, POWDER, FOR SOLUTION INTRAMUSCULAR; INTRAVENOUS at 11:56

## 2021-06-19 RX ADMIN — MORPHINE SULFATE PRN MG: 2 INJECTION, SOLUTION INTRAMUSCULAR; INTRAVENOUS at 17:32

## 2021-06-19 NOTE — CONSULTATION
History of Present Illness





- Reason for Consult


Consult date: 06/19/21





- History of Present Illness





This is a 67 y/o M with PMH of AAA repair, HTN, and penile implant who presented

to [facility] with c/o abdominal pain, nausea, vomiting, poor oral intake, and 

fatigue. Pt states he hasn't eaten anything for the past 4 days. CT AP w/o IV 

contrast showed high grade SBO with transition in the central abdomen, no 

pneumatosis or free air. Pt was hypotensive in ED which improved s/p IV fluids. 

General surgery consulted. Pt found to have ILEANA with SCr level of 3.4. We were 

consulted to evaluate this pt who has ILEANA. Pt denies hx of kidney problems and 

NSAID use. Pt seen in bed, no acute distress, no family at bedside





.





Past History


Past Medical History: hypertension


Past Surgical History: Other (Aortic artery aneurysm repair, penile implant)


Social history: other.  denies: smoking, alcohol abuse, prescription drug abuse


Family history: no significant family history





Medications and Allergies


                                    Allergies











Allergy/AdvReac Type Severity Reaction Status Date / Time


 


No Known Allergies Allergy   Verified 06/19/21 15:01











                                Home Medications











 Medication  Instructions  Recorded  Confirmed  Last Taken  Type


 


AtorvaSTATin 40 mg PO QHS 06/11/20 06/19/21 Unknown History


 


Metoprolol Succinate 50 mg PO DAILY 06/11/20 06/19/21 1 Day Ago History





    ~06/18/21 











Active Meds: 


Active Medications





Ceftriaxone Sodium (Rocephin/Ns 1 Gm/50 Ml)  1 gm in 50 mls @ 100 mls/hr IV Q24H

LEO; Protocol


   Last Admin: 06/19/21 11:56 Dose:  100 mls/hr


   Documented by: 


Sodium Chloride (Nacl 0.9% 1000 Ml)  1,000 mls @ 150 mls/hr IV AS DIRECT LEO


   Last Admin: 06/19/21 11:56 Dose:  150 mls/hr


   Documented by: 


Morphine Sulfate (Morphine 2 Mg/1 Ml Inj)  2 mg IV Q3H PRN


   PRN Reason: Pain, Moderate (4-6)


   Last Admin: 06/19/21 11:56 Dose:  2 mg


   Documented by: 


Ondansetron HCl (Ondansetron 4 Mg/2 Ml Inj)  4 mg IV Q6H PRN


   PRN Reason: N/V unrelieved by Reglan











Review of Systems


Constitutional: fatigue, poor appetite


Cardiovascular: no chest pain, no shortness of breath


Respiratory: no shortness of breath, no dyspnea on exertion


Gastrointestinal: abdominal pain, nausea, vomiting


Genitourinary Male: no dysuria, no hematuria


Integumentary: no wounds


Neurological: no numbness, no tingling


Psychiatric: no anxiety, no depression





Exam





- Vital Signs


Vital signs: 


                                   Vital Signs











Pulse Ox


 


 93 


 


 06/19/21 07:44














- General Appearance


General appearance: fatigue


EENT: ATNC


Neck: Present: neck supple


Respiratory: Decreased Breath Sounds


Heart: regular, S1S2


Gastrointestinal: Present: tenderness (nasogastric tube in place)


Integumentary: warm and dry


Neurologic: alert and oriented x3


Musculoskeletal: Present: other (no edema to BLE)


Psychiatric: mood/affect appropriate, cooperative





Results





- Lab Results





                                 06/19/21 08:21





                                 06/19/21 08:21


                             Most recent lab results











Calcium  9.1 mg/dL (8.4-10.2)   06/19/21  08:21    














Assessment and Plan





High Grade Small Bowel Obstruction


ILEANA possibly 2/2 prerenal etiology in setting of vomiting and poor oral intake, 

hypotension, no obstruction


Hypotension


Hypokalemia


Hx of AAA repair








Plan:





- Renal function reviewed, SCr level was 3.4 today


- Most recent SCr level in [facility] prior to this admission was 0.9 on 6/14/20. 

Pt had elevated SCr level of 2.3 on 6/11/20, but normalized on 6/14/20


- On 0.9% NS infusion at 150 ml/hr


- Replete potassium


- UA showed elevated rbc, obtain protein, urine lytes, eosinophils


- Started on Abx


- Currently NPO


- Renally dose meds


- Constantino Catheter: No


- Renal plan reviewed by Dr Parker

## 2021-06-19 NOTE — EMERGENCY DEPARTMENT REPORT
ED Syncope HPI





- General


Chief Complaint: Syncope


Stated Complaint: N/V SYNCOPE


Time Seen by Provider: 06/19/21 07:53


Source: patient, EMS





- History of Present Illness


Initial Comments: 





66-year-old male, history of hypertension, AAA status post repair, left inguinal

hernia repair, penile implant, small bowel obstruction, presents to ED with 

nausea, vomiting, and syncopal episode.  Patient reports he has had some 

abdominal pain, nausea and vomiting x3 days.  Patient denies diarrhea, reports 

only "slime" coming out, no formed stool, non-bloody.  Patient denies any fever.

 States abdominal pain is periumbilical.  Patient reports history of repaired 

abdominal aortic aneurysm.  States he contacted his vascular surgeon regarding 

his symptoms, stated symptoms are likely not due to to his vascular problems.  

Patient denies any sick contacts.  Reports he is fully vaccinated for COVID-19. 

Patient had a syncopal episode this morning while walking to his truck.  Upon 

EMS arrival BP was 87/62.  Patient given 1L normal saline by EMS.


Timing/Prior Episodes: single episode today


Precipitating Factors: Positive: lightheadedness


Context: standing


Loss of Consciousness: brief (seconds)


Current Symptoms: back to normal.  denies: chest pain, headache





- Related Data


Allergies/Adverse Reactions: 


Allergies





No Known Allergies Allergy (Unverified 11/10/18 09:51)


   








Home Medications: 


Ambulatory Orders





AtorvaSTATin 40 mg PO QHS 06/11/20 


Metoprolol Succinate 50 mg PO BID 06/11/20 











ED Review of Systems


ROS: 


Stated complaint: N/V SYNCOPE


Other details as noted in HPI





Comment: All other systems reviewed and negative


Constitutional: denies: fever


Respiratory: denies: shortness of breath


Cardiovascular: denies: chest pain


Gastrointestinal: abdominal pain, nausea, vomiting.  denies: diarrhea





ED Past Medical Hx





- Past Medical History


Hx Hypertension: Yes


Hx Congestive Heart Failure: No


Hx Diabetes: No


Hx Asthma: No


Hx COPD: No


Hx HIV: No


Additional medical history: Constipation, Left eye prosthesis, Penile implant





- Surgical History


Additional Surgical History: left eye surgery, Penile surgery





- Social History


Smoking Status: Never Smoker





- Medications


Home Medications: 


                                Home Medications











 Medication  Instructions  Recorded  Confirmed  Last Taken  Type


 


AtorvaSTATin 40 mg PO QHS 06/11/20 06/19/21 Unknown History


 


Metoprolol Succinate 50 mg PO BID 06/11/20 06/19/21 Unknown History














ED Physical Exam





- General


Limitations: No Limitations


General appearance: alert, in no apparent distress





- Head


Head exam: Present: atraumatic, normocephalic





- Eye


Eye exam: Present: normal appearance





- ENT


ENT exam: Present: mucous membranes moist





- Neck


Neck exam: Present: normal inspection





- Respiratory


Respiratory exam: Present: normal lung sounds bilaterally.  Absent: respiratory 

distress





- Cardiovascular


Cardiovascular Exam: Present: regular rate, normal rhythm





- GI/Abdominal


GI/Abdominal exam: Present: soft, other (Penile pump reservoir palpable in left 

lower quadrant).  Absent: distended, tenderness





- Extremities Exam


Extremities exam: Present: normal inspection





- Neurological Exam


Neurological exam: Present: alert, oriented X3





- Psychiatric


Psychiatric exam: Present: normal affect, normal mood





- Skin


Skin exam: Present: warm, dry, intact, normal color





ED Course


                                   Vital Signs











  06/19/21 06/19/21 06/19/21





  07:44 07:47 07:57


 


Temperature   97.7 F


 


Pulse Rate  80 80


 


Respiratory  27 H 26 H





Rate   


 


Blood Pressure   


 


Blood Pressure   106/76





[Right]   


 


O2 Sat by Pulse 93  97





Oximetry   














  06/19/21 06/19/21 06/19/21





  08:01 10:15 10:45


 


Temperature   


 


Pulse Rate 80 82 79


 


Respiratory 21 21 27 H





Rate   


 


Blood Pressure 103/76 138/91 105/73


 


Blood Pressure   





[Right]   


 


O2 Sat by Pulse 94 100 96





Oximetry   














  06/19/21 06/19/21





  11:31 12:01


 


Temperature  


 


Pulse Rate 83 87


 


Respiratory 24 25 H





Rate  


 


Blood Pressure 93/58 103/72


 


Blood Pressure  





[Right]  


 


O2 Sat by Pulse  





Oximetry  














- Consultations


Consultation #1: 





06/19/21 09:55


Spoke w/ Dr Parker, nephrology on-call.  Will consult on patient.








Consultation #2: 





06/19/21 10:31


Spoke with Dr. Kramer, general surgery.  Recommends NG tube.  Will see patient.





ED Medical Decision Making





- Lab Data


Result diagrams: 


                                 06/19/21 08:21





                                 06/19/21 08:21





- Radiology Data


Radiology results: report reviewed, image reviewed





- Medical Decision Making





66-year-old male presents to ED following syncopal episode at home.  Upon EMS 

arrival patient was hypotensive with systolic BP in the 80s.  Patient reports he

 has had 3-day history of nausea and vomiting.  CT scan shows high-grade small 

bowel obstruction.  Labs show evidence of acute renal failure with BUN and 

creatinine of 70 and 3.4.  IV fluids given.  Blood pressure is improved.  NG 

tube placed.  General surgery and nephrology have been consulted.  Patient will 

be admitted by hospitalist, Dr. Aguilar, for further management.





- Differential Diagnosis


Dehydration, renal failure, bowel obstruction, viral illness


Critical Care Time: Yes


Critical care time in (mins) excluding proc time.: 35


Critical care attestation.: 


If time is entered above; I have spent that time in minutes in the direct care 

of this critically ill patient, excluding procedure time.





Critical Care Time: 





35 min





ED Disposition


Clinical Impression: 


 Syncope, Nausea and vomiting, Acute renal failure, Hypokalemia, SBO (small 

bowel obstruction)





Disposition: DC-09 OP ADMIT IP TO THIS HOSP


Is pt being admited?: Yes


Condition: Stable


Time of Disposition: 10:52

## 2021-06-19 NOTE — HISTORY AND PHYSICAL REPORT
History of Present Illness


Date of examination: 06/19/21


Chief complaint: 


Abdominal pain


History of present illness: 





66-year-old -American male with past medical history significant for a

ortic aneurysm repair, hypertension, penile implant presented to the emergency 

department with complaints of abdominal pain that started 4 days ago.  Patient 

states the pain was sharp, 10 out of 10 intensity, with no radiation, no 

aggravating or alleviating factors identified, patient has associated persistent

nausea and vomiting.  She was not eating and drinking for the last 4 days.  

Patient did not have any bowel movement, could not pass gas.  Patient is to call

his vascular surgeon and told him to go to the emergency department, his 

vascular surgeon stated it is not a vascular problem.


   In the emergency department patient was hypotensive and was given bolus of IV

fluids and blood pressure was corrected.  CT abdomen and pelvis showed small 

bowel obstruction with transition point.  General surgery was consulted.  

Patient has elevated BUN and creatinine and nephrology was consulted.  Patient 

admitted to the floor for further evaluation and management.








REVIEW OF SYSTEMS:


GENERAL: no weight change, no fatigue, no fever


HEAD: no head ache


EYES: no blurry vision, no acute visual loss


EARS: no hearing loss, no discharge, no earache


NOSE: no stuffiness, no sneezing, no discharge


MOUTH, THROAT AND NECK: no bleeding gums, no sore throat, no swollen neck


CARDIAC: no palpitations, no dyspnea on exertion, no orthopnea, no PND, no 

edema, no chest pain


RESPIRATORY: no shortness of breath, no wheeze, no cough, no sputum, no 

hemoptysis, no asthma


GI: As stated in the HPI.


URINARY: No urgency, hematuria, dysuria or frequency.


MUSCULOSKELETAL: no muscle weakness, no pain, no joint stiffness


NEUROLOGIC: no loss of sensation/numbness, no tingling, no tremors, no 

weakness/paralysis


HEMATOLOGIC: no anemia, no easy bruising


SKIN: no rashes


ENDOCRINE: no heat/cold intolerance, no polyuria, no polydipsia, no thyroid 

problems, no diabetes


PSYCHIATRIC: no anxiety, no depression, no suicidal ideations





Past History


Past Medical History: hypertension


Past Surgical History: Other (Aortic artery aneurysm repair, penile implant)


Social history: other.  denies: smoking, alcohol abuse, prescription drug abuse


Family history: no significant family history





Medications and Allergies


                                    Allergies











Allergy/AdvReac Type Severity Reaction Status Date / Time


 


No Known Allergies Allergy   Unverified 11/10/18 09:51











                                Home Medications











 Medication  Instructions  Recorded  Confirmed  Last Taken  Type


 


AtorvaSTATin 40 mg PO QHS 06/11/20 06/19/21 Unknown History


 


Metoprolol Succinate 50 mg PO BID 06/11/20 06/19/21 Unknown History











Active Meds: 


Active Medications





Ceftriaxone Sodium (Rocephin/Ns 1 Gm/50 Ml)  1 gm in 50 mls @ 100 mls/hr IV Q24H

[facility]; Protocol


Sodium Chloride (Nacl 0.9% 1000 Ml)  1,000 mls @ 150 mls/hr IV AS DIRECT LEO


Morphine Sulfate (Morphine 2 Mg/1 Ml Inj)  2 mg IV Q3H PRN


   PRN Reason: Pain, Moderate (4-6)


Ondansetron HCl (Ondansetron 4 Mg/2 Ml Inj)  4 mg IV Q6H PRN


   PRN Reason: N/V unrelieved by Reglan











Exam





- Physical Exam


Narrative exam: 





 Not in cardiopulmonary distress. 


 The patient appeared well nourished and normally developed.


 Vital signs as documented.


 Head exam is unremarkable.


 No scleral icterus .


 Neck is without jugular venous distension, thyromegaly, or carotid bruits. 


 Lungs are clear to auscultation.


Cardiac exam reveals regular rate and  Rhythm. 


Abdominal exam reveals normal bowel sounds, nontender, no organomegaly.


Extremities are nonedematous and both femoral and pedal pulses are normal.


CNS: Alert and oriented 3.  No focal weakness.





- Constitutional


Vitals: 


                                        











Temp Pulse Resp BP Pulse Ox


 


 97.7 F   82   21   138/91   100 


 


 06/19/21 07:57  06/19/21 10:15  06/19/21 10:15  06/19/21 10:15  06/19/21 10:15














Results





- Labs


CBC & Chem 7: 


                                 06/19/21 08:21





                                 06/19/21 08:21


Labs: 


                             Laboratory Last Values











WBC  8.7 K/mm3 (4.5-11.0)   06/19/21  08:21    


 


RBC  4.40 M/mm3 (3.65-5.03)   06/19/21  08:21    


 


Hgb  15.0 gm/dl (11.8-15.2)   06/19/21  08:21    


 


Hct  42.5 % (35.5-45.6)   06/19/21  08:21    


 


MCV  97 fl (84-94)  H  06/19/21  08:21    


 


MCH  34 pg (28-32)  H  06/19/21  08:21    


 


MCHC  35 % (32-34)  H  06/19/21  08:21    


 


RDW  12.8 % (13.2-15.2)  L  06/19/21  08:21    


 


Plt Count  210 K/mm3 (140-440)   06/19/21  08:21    


 


Mono % (Auto)  Np   06/19/21  08:21    


 


Add Manual Diff  Complete   06/19/21  08:21    


 


Total Counted  100   06/19/21  08:21    


 


Seg Neuts % (Manual)  81.0 % (40.0-70.0)  H  06/19/21  08:21    


 


Lymphocytes % (Manual)  6.0 % (13.4-35.0)  L  06/19/21  08:21    


 


Monocytes % (Manual)  12.0 % (0.0-7.3)  H  06/19/21  08:21    


 


Eosinophils % (Manual)  1.0 % (0.0-4.3)   06/19/21  08:21    


 


Nucleated RBC %  Not Reportable   06/19/21  08:21    


 


Seg Neutrophils # Man  7.0 K/mm3 (1.8-7.7)   06/19/21  08:21    


 


Band Neutrophils #  0.0 K/mm3  06/19/21  08:21    


 


Lymphocytes # (Manual)  0.5 K/mm3 (1.2-5.4)  L  06/19/21  08:21    


 


Abs React Lymphs (Man)  0.0 K/mm3  06/19/21  08:21    


 


Monocytes # (Manual)  1.0 K/mm3 (0.0-0.8)  H  06/19/21  08:21    


 


Eosinophils # (Manual)  0.1 K/mm3 (0.0-0.4)   06/19/21  08:21    


 


Basophils # (Manual)  0.0 K/mm3 (0.0-0.1)   06/19/21  08:21    


 


Metamyelocytes #  0.0 K/mm3  06/19/21  08:21    


 


Myelocytes #  0.0 K/mm3  06/19/21  08:21    


 


Promyelocytes #  0.0 K/mm3  06/19/21  08:21    


 


Blast Cells #  0.0 K/mm3  06/19/21  08:21    


 


WBC Morphology  Not Reportable   06/19/21  08:21    


 


WBC Morphology  TNR   06/19/21  08:21    


 


Hypersegmented Neuts  Not Reportable   06/19/21  08:21    


 


Hyposegmented Neuts  Not Reportable   06/19/21  08:21    


 


Hypogranular Neuts  Not Reportable   06/19/21  08:21    


 


Smudge Cells  Not Reportable   06/19/21  08:21    


 


Toxic Granulation  Not Reportable   06/19/21  08:21    


 


Toxic Vacuolation  Not Reportable   06/19/21  08:21    


 


Dohle Bodies  Not Reportable   06/19/21  08:21    


 


Pelger-Huet Anomaly  Not Reportable   06/19/21  08:21    


 


Pepito Rods  Not Reportable   06/19/21  08:21    


 


Platelet Estimate  Consistent w auto   06/19/21  08:21    


 


Clumped Platelets  Not Reportable   06/19/21  08:21    


 


Plt Clumps, EDTA  Not Reportable   06/19/21  08:21    


 


Large Platelets  Not Reportable   06/19/21  08:21    


 


Giant Platelets  Not Reportable   06/19/21  08:21    


 


Platelet Satelliting  Not Reportable   06/19/21  08:21    


 


Plt Morphology Comment  Not Reportable   06/19/21  08:21    


 


RBC Morphology  Not Reportable   06/19/21  08:21    


 


Dimorphic RBCs  Not Reportable   06/19/21  08:21    


 


Polychromasia  Not Reportable   06/19/21  08:21    


 


Hypochromasia  Not Reportable   06/19/21  08:21    


 


Poikilocytosis  Not Reportable   06/19/21  08:21    


 


Anisocytosis  Not Reportable   06/19/21  08:21    


 


Microcytosis  Not Reportable   06/19/21  08:21    


 


Macrocytosis  Not Reportable   06/19/21  08:21    


 


Spherocytes  Not Reportable   06/19/21  08:21    


 


Pappenheimer Bodies  Not Reportable   06/19/21  08:21    


 


Sickle Cells  Not Reportable   06/19/21  08:21    


 


Target Cells  Not Reportable   06/19/21  08:21    


 


Tear Drop Cells  Not Reportable   06/19/21  08:21    


 


Ovalocytes  Not Reportable   06/19/21  08:21    


 


Helmet Cells  Not Reportable   06/19/21  08:21    


 


[place] Bodies  Not Reportable   06/19/21  08:21    


 


Cabot Rings  Not Reportable   06/19/21  08:21    


 


Star Cells  Few   06/19/21  08:21    


 


Bite Cells  Not Reportable   06/19/21  08:21    


 


Crenated Cell  Not Reportable   06/19/21  08:21    


 


Elliptocytes  Few   06/19/21  08:21    


 


Acanthocytes (Spur)  Not Reportable   06/19/21  08:21    


 


Rouleaux  Not Reportable   06/19/21  08:21    


 


Hemoglobin C Crystals  Not Reportable   06/19/21  08:21    


 


Schistocytes  Rare   06/19/21  08:21    


 


Malaria parasites  Not Reportable   06/19/21  08:21    


 


Linus Bodies  Not Reportable   06/19/21  08:21    


 


Hem Pathologist Commnt  No   06/19/21  08:21    


 


Sodium  134 mmol/L (137-145)  L  06/19/21  08:21    


 


Potassium  3.3 mmol/L (3.6-5.0)  L  06/19/21  08:21    


 


Chloride  86.6 mmol/L ()  L  06/19/21  08:21    


 


Carbon Dioxide  31 mmol/L (22-30)  H  06/19/21  08:21    


 


Anion Gap  20 mmol/L  06/19/21  08:21    


 


BUN  70 mg/dL (9-20)  H  06/19/21  08:21    


 


Creatinine  3.4 mg/dL (0.8-1.3)  H  06/19/21  08:21    


 


Estimated GFR  22 ml/min  06/19/21  08:21    


 


BUN/Creatinine Ratio  21 %  06/19/21  08:21    


 


Glucose  100 mg/dL ()   06/19/21  08:21    


 


Calcium  9.1 mg/dL (8.4-10.2)   06/19/21  08:21    


 


Total Bilirubin  0.80 mg/dL (0.1-1.2)   06/19/21  08:21    


 


Direct Bilirubin  0.2 mg/dL (0-0.2)   06/19/21  08:21    


 


Indirect Bilirubin  0.6 mg/dL  06/19/21  08:21    


 


AST  16 units/L (5-40)   06/19/21  08:21    


 


ALT  9 units/L (7-56)   06/19/21  08:21    


 


Alkaline Phosphatase  72 units/L ()   06/19/21  08:21    


 


Total Protein  8.0 g/dL (6.3-8.2)   06/19/21  08:21    


 


Albumin  4.3 g/dL (3.9-5)   06/19/21  08:21    


 


Albumin/Globulin Ratio  1.2 %  06/19/21  08:21    


 


Lipase  18 units/L (13-60)   06/19/21  08:21    


 


Urine Color  Ema  (Yellow)   06/19/21  09:23    


 


Urine Turbidity  Cloudy  (Clear)   06/19/21  09:23    


 


Urine pH  5.0  (5.0-7.0)   06/19/21  09:23    


 


Ur Specific Gravity  1.017  (1.003-1.030)   06/19/21  09:23    


 


Urine Protein  100 mg/dl mg/dL (Negative)   06/19/21  09:23    


 


Urine Glucose (UA)  Neg mg/dL (Negative)   06/19/21  09:23    


 


Urine Ketones  Neg mg/dL (Negative)   06/19/21  09:23    


 


Urine Blood  Mod  (Negative)   06/19/21  09:23    


 


Urine Nitrite  Neg  (Negative)   06/19/21  09:23    


 


Urine Bilirubin  Neg  (Negative)   06/19/21  09:23    


 


Urine Urobilinogen  2.0 mg/dL (<2.0)   06/19/21  09:23    


 


Ur Leukocyte Esterase  Neg  (Negative)   06/19/21  09:23    


 


Urine WBC (Auto)  9.0 /HPF (0.0-6.0)  H  06/19/21  09:23    


 


Urine RBC (Auto)  10.0 /HPF (0.0-6.0)   06/19/21  09:23    


 


U Epithel Cells (Auto)  2.0 /HPF (0-13.0)   06/19/21  09:23    


 


Hyaline Casts  23 /LPF  06/19/21  09:23    


 


Urine Mucus  2+ /HPF  06/19/21  09:23    














Assessment and Plan


Assessment and plan: 





High-grade small bowel obstruction


-N.p.o., IV fluids, pain control


-during my examination patient was given pain medication and general pain 

resolved


-General surgery consulted


-We will continue to monitor





Hypotension


-Likely due to poor oral intake, persistent nausea and vomiting


-Patient was given bolus of IV fluids and corrected


-We will continue with normal saline


-Held blood pressure medications





Acute renal failure


-Likely due to dehydration, patient is on IV fluids 


-Nephrology consulted





History of aortic aneurysm repair


-CT did not show acute abnormalities


-H&H is stable








DVT prophylaxis


-SCDs because patient may need surgery





CODE STATUS


-Full


Disposition; admit to medical floor.





Management plan was discussed with the patient and was in agreement with the 

plan of care.





VTE prophylaxis?: Mechanical


Contraindication Mechanical VTE Prophylaxis: Contraindicated


Reason for no VTE Prophylaxis: Surgical contraindication


Plan of care discussed with patient/family: Yes

## 2021-06-19 NOTE — CAT SCAN REPORT
CT ABDOMEN AND PELVIS WITHOUT CONTRAST



INDICATION / CLINICAL INFORMATION: vomiting, abd pain   WO  LOW GFR.



TECHNIQUE: Axial CT images were obtained through the abdomen and pelvis without IV contrast.  All CT 
scans at this location are performed using CT dose reduction for ALARA by means of automated exposure
 control. 



COMPARISON: CT from 6/11/2020.



FINDINGS:



FINDINGS: Examination is limited due to lack of intravenous contrast and paucity of intraabdominal fa
t.



Bibasilar volume loss. Liver, gallbladder, pancreas, spleen, adrenals, kidneys, and bladder demonstra
te no acute abnormality.



Dilated stomach and small bowel with transition to normal caliber bowel within the central abdomen (s
eries 2 image 71). Distal small bowel and colon are decompressed. No pneumatosis or free air.



Similar surgical changes of prior aortic repair, not further characterized without IV contrast. No si
gnificant intraperitoneal free fluid. Penile prosthesis apparatus is unchanged. No acute osseous find
ings.



IMPRESSION:

High-grade small bowel obstruction with transition in the central abdomen. No pneumatosis or free air
.



Signer Name: Alvin Carvajal MD 

Signed: 6/19/2021 10:09 AM

Workstation Name: [email or web address]-

## 2021-06-19 NOTE — CONSULTATION
History of Present Illness


Consult date: 06/19/21


Reason for consult: abdominal pain





- History of present illness


History of present illness: 





65 yo male s/p AAA repair with several days of diffuse abdominal pain, nausea 

and vomiting.  He has had prior admissions for sbo which were successfully 

treated with NG decompression.  Denies hematemesis, melena or hematochezia.





Past History


Past Medical History: hypertension


Past Surgical History: Other (Aortic artery aneurysm repair, penile implant)


Social history: other.  denies: smoking, alcohol abuse, prescription drug abuse


Family history: no significant family history





Medications and Allergies


                                    Allergies











Allergy/AdvReac Type Severity Reaction Status Date / Time


 


No Known Allergies Allergy   Verified 06/19/21 15:01











                                Home Medications











 Medication  Instructions  Recorded  Confirmed  Last Taken  Type


 


AtorvaSTATin 40 mg PO QHS 06/11/20 06/19/21 Unknown History


 


Metoprolol Succinate 50 mg PO DAILY 06/11/20 06/19/21 1 Day Ago History





    ~06/18/21 











Active Meds: 


Active Medications





Ceftriaxone Sodium (Rocephin/Ns 1 Gm/50 Ml)  1 gm in 50 mls @ 100 mls/hr IV Q24H

[facility]; Protocol


   Last Admin: 06/19/21 11:56 Dose:  100 mls/hr


   Documented by: 


Sodium Chloride (Nacl 0.9% 1000 Ml)  1,000 mls @ 150 mls/hr IV AS DIRECT [facility]


   Last Admin: 06/19/21 14:36 Dose:  150 mls/hr


   Documented by: 


Potassium Chloride (Kcl 10meq/100ml)  10 meq in 100 mls @ 100 mls/hr IV Q1H [facility]


   Stop: 06/19/21 17:59


Morphine Sulfate (Morphine 2 Mg/1 Ml Inj)  2 mg IV Q3H PRN


   PRN Reason: Pain, Moderate (4-6)


   Last Admin: 06/19/21 11:56 Dose:  2 mg


   Documented by: 


Ondansetron HCl (Ondansetron 4 Mg/2 Ml Inj)  4 mg IV Q6H PRN


   PRN Reason: N/V unrelieved by Reglan











Review of Systems


All systems: negative (none)





Exam


                                   Vital Signs











Pulse Ox


 


 93 


 


 06/19/21 07:44














- General physical appearance


Positive: well developed, well nourished, no distress





- Eyes


Positive: PERRL, normal occular movement





- ENT


Positive: normal pinna, normal nares, normal mucosa, no hearing loss, no 

congestion





- Neck


Positive: no masses, no bruits, trachea midline, no venous distension





- Respiratory


Positive: normal expansion, normal respiratory effort, clear to auscultation





- Cardiovascular


Rhythm: regular


Heart Sounds: Present: S1 & S2.  Absent: rub, click





- Extremities


Extremities: no ischemia, pulses symmetrical, No edema





- Breasts


Breasts: normal, no mass, no skin changes





- Abdomen


Abdomen: Present: soft, bowel sounds normal.  Absent: tender, distended


Hernia: none





- Genitourinary


Male Genitourinary: normal


Female Genitourinary: normal





- Integumentary


no rash, no growths, no abnormal pigmentation





- Neurologic


Neurologic: alert and oriented to time, place and person, motor strength and 

sensation are grossly intact





- Musculoskeletal


normal gait, normal posture





- Psychiatric


Psychiatric: appropriate mood/affect, intact judgment & insight





Results





- Labs





                                 06/19/21 08:21





                                 06/19/21 08:21


                              Abnormal lab results











  06/19/21 06/19/21 06/19/21 Range/Units





  08:21 08:21 09:23 


 


MCV  97 H    (84-94)  fl


 


MCH  34 H    (28-32)  pg


 


MCHC  35 H    (32-34)  %


 


RDW  12.8 L    (13.2-15.2)  %


 


Seg Neuts % (Manual)  81.0 H    (40.0-70.0)  %


 


Lymphocytes % (Manual)  6.0 L    (13.4-35.0)  %


 


Monocytes % (Manual)  12.0 H    (0.0-7.3)  %


 


Lymphocytes # (Manual)  0.5 L    (1.2-5.4)  K/mm3


 


Monocytes # (Manual)  1.0 H    (0.0-0.8)  K/mm3


 


Sodium   134 L   (137-145)  mmol/L


 


Potassium   3.3 L   (3.6-5.0)  mmol/L


 


Chloride   86.6 L   ()  mmol/L


 


Carbon Dioxide   31 H   (22-30)  mmol/L


 


BUN   70 H   (9-20)  mg/dL


 


Creatinine   3.4 H   (0.8-1.3)  mg/dL


 


Urine WBC (Auto)    9.0 H  (0.0-6.0)  /HPF


 


Urine Creatinine     (0.1-20.0)  mg/dL


 


Urine Total Protein     (5-11.8)  mg/dL














  06/19/21 Range/Units





  Unknown 


 


MCV   (84-94)  fl


 


MCH   (28-32)  pg


 


MCHC   (32-34)  %


 


RDW   (13.2-15.2)  %


 


Seg Neuts % (Manual)   (40.0-70.0)  %


 


Lymphocytes % (Manual)   (13.4-35.0)  %


 


Monocytes % (Manual)   (0.0-7.3)  %


 


Lymphocytes # (Manual)   (1.2-5.4)  K/mm3


 


Monocytes # (Manual)   (0.0-0.8)  K/mm3


 


Sodium   (137-145)  mmol/L


 


Potassium   (3.6-5.0)  mmol/L


 


Chloride   ()  mmol/L


 


Carbon Dioxide   (22-30)  mmol/L


 


BUN   (9-20)  mg/dL


 


Creatinine   (0.8-1.3)  mg/dL


 


Urine WBC (Auto)   (0.0-6.0)  /HPF


 


Urine Creatinine  198.5 H  (0.1-20.0)  mg/dL


 


Urine Total Protein  38 H  (5-11.8)  mg/dL








                                 Diabetes panel











  06/19/21 Range/Units





  08:21 


 


Sodium  134 L  (137-145)  mmol/L


 


Potassium  3.3 L  (3.6-5.0)  mmol/L


 


Chloride  86.6 L  ()  mmol/L


 


Carbon Dioxide  31 H  (22-30)  mmol/L


 


BUN  70 H  (9-20)  mg/dL


 


Creatinine  3.4 H  (0.8-1.3)  mg/dL


 


Glucose  100  ()  mg/dL


 


Calcium  9.1  (8.4-10.2)  mg/dL


 


AST  16  (5-40)  units/L


 


ALT  9  (7-56)  units/L


 


Alkaline Phosphatase  72  ()  units/L


 


Total Protein  8.0  (6.3-8.2)  g/dL


 


Albumin  4.3  (3.9-5)  g/dL








                                  Calcium panel











  06/19/21 Range/Units





  08:21 


 


Calcium  9.1  (8.4-10.2)  mg/dL


 


Albumin  4.3  (3.9-5)  g/dL








                                 Pituitary panel











  06/19/21 Range/Units





  08:21 


 


Sodium  134 L  (137-145)  mmol/L


 


Potassium  3.3 L  (3.6-5.0)  mmol/L


 


Chloride  86.6 L  ()  mmol/L


 


Carbon Dioxide  31 H  (22-30)  mmol/L


 


BUN  70 H  (9-20)  mg/dL


 


Creatinine  3.4 H  (0.8-1.3)  mg/dL


 


Glucose  100  ()  mg/dL


 


Calcium  9.1  (8.4-10.2)  mg/dL








                                  Adrenal panel











  06/19/21 Range/Units





  08:21 


 


Sodium  134 L  (137-145)  mmol/L


 


Potassium  3.3 L  (3.6-5.0)  mmol/L


 


Chloride  86.6 L  ()  mmol/L


 


Carbon Dioxide  31 H  (22-30)  mmol/L


 


BUN  70 H  (9-20)  mg/dL


 


Creatinine  3.4 H  (0.8-1.3)  mg/dL


 


Glucose  100  ()  mg/dL


 


Calcium  9.1  (8.4-10.2)  mg/dL


 


Total Bilirubin  0.80  (0.1-1.2)  mg/dL


 


AST  16  (5-40)  units/L


 


ALT  9  (7-56)  units/L


 


Alkaline Phosphatase  72  ()  units/L


 


Total Protein  8.0  (6.3-8.2)  g/dL


 


Albumin  4.3  (3.9-5)  g/dL














- Imaging


CT scan - abdomen: report reviewed


CT scan - pelvis: report reviewed





Assessment and Plan





- Patient Problems


(1) SBO (small bowel obstruction)


Current Visit: Yes   Status: Acute   


Plan to address problem: 


1) NG decompression


 2) Correct dehydration


 3) CBC, BMP & AXR in the am

## 2021-06-20 LAB
BASOPHILS # (AUTO): 0 K/MM3 (ref 0–0.1)
BASOPHILS NFR BLD AUTO: 0.2 % (ref 0–1.8)
BUN SERPL-MCNC: 85 MG/DL (ref 9–20)
BUN SERPL-MCNC: 89 MG/DL (ref 9–20)
BUN/CREAT SERPL: 28 %
BUN/CREAT SERPL: 30 %
CALCIUM SERPL-MCNC: 8.5 MG/DL (ref 8.4–10.2)
CALCIUM SERPL-MCNC: 8.9 MG/DL (ref 8.4–10.2)
EOSINOPHIL # BLD AUTO: 0 K/MM3 (ref 0–0.4)
EOSINOPHIL NFR BLD AUTO: 0.1 % (ref 0–4.3)
HCT VFR BLD CALC: 46.3 % (ref 35.5–45.6)
HEMOLYSIS INDEX: 11
HEMOLYSIS INDEX: 3
HGB BLD-MCNC: 16.1 GM/DL (ref 11.8–15.2)
LYMPHOCYTES # BLD AUTO: 0.6 K/MM3 (ref 1.2–5.4)
LYMPHOCYTES NFR BLD AUTO: 6.1 % (ref 13.4–35)
MCHC RBC AUTO-ENTMCNC: 35 % (ref 32–34)
MCV RBC AUTO: 97 FL (ref 84–94)
MONOCYTES # (AUTO): 0.9 K/MM3 (ref 0–0.8)
MONOCYTES % (AUTO): 9.4 % (ref 0–7.3)
PLATELET # BLD: 211 K/MM3 (ref 140–440)
RBC # BLD AUTO: 4.78 M/MM3 (ref 3.65–5.03)

## 2021-06-20 RX ADMIN — POTASSIUM CHLORIDE SCH MLS/HR: 10 INJECTION, SOLUTION INTRAVENOUS at 12:48

## 2021-06-20 RX ADMIN — POTASSIUM CHLORIDE SCH MLS/HR: 10 INJECTION, SOLUTION INTRAVENOUS at 19:12

## 2021-06-20 RX ADMIN — MORPHINE SULFATE PRN MG: 2 INJECTION, SOLUTION INTRAMUSCULAR; INTRAVENOUS at 22:06

## 2021-06-20 RX ADMIN — ONDANSETRON PRN MG: 2 INJECTION INTRAMUSCULAR; INTRAVENOUS at 02:43

## 2021-06-20 RX ADMIN — POTASSIUM CHLORIDE SCH MLS/HR: 10 INJECTION, SOLUTION INTRAVENOUS at 17:37

## 2021-06-20 RX ADMIN — CEFTRIAXONE SODIUM SCH MLS/HR: 1 INJECTION, POWDER, FOR SOLUTION INTRAMUSCULAR; INTRAVENOUS at 18:40

## 2021-06-20 RX ADMIN — SODIUM CHLORIDE SCH MLS/HR: 0.9 INJECTION, SOLUTION INTRAVENOUS at 12:48

## 2021-06-20 RX ADMIN — POTASSIUM CHLORIDE SCH MLS/HR: 10 INJECTION, SOLUTION INTRAVENOUS at 16:25

## 2021-06-20 NOTE — PROGRESS NOTE
Assessment and Plan





High Grade Small Bowel Obstruction


ILEANA possibly 2/2 prerenal etiology in setting of vomiting and poor oral intake, 

hypotension, no obstruction


Hypotension


Hypokalemia


Hx of AAA repair








Plan:





- Renal function reviewed, SCr level was 2.8 today, yesterday's SCr level was 

3.4


- Most recent SCr level in [facility] prior to this admission was 0.9 on 6/14/20. 

Pt had elevated SCr level of 2.3 on 6/11/20, but normalized on 6/14/20


- On 0.9% NS infusion at 150 ml/hr


- Replete potassium


- UA showed elevated rbc, no significant proteinuria, no urine eosinophils


- No hydronephrosis noted on CT AP w/o contrast study on 6/19/21


- Started on Abx


- Currently NPO


- Renally dose meds


- Constantino Catheter: No


- Renal plan reviewed by Dr Parker





Subjective


Date of service: 06/20/21


Interval history: 





Pt seen in bed, states he has less nausea today, no acute distress, no family at

bedside





Objective





- Vital Signs


Vital signs: 


                               Vital Signs - 12hr











  06/19/21 06/19/21





  22:03 22:57


 


Temperature 98.6 F 


 


Pulse Rate 41 L 


 


Respiratory 20 





Rate  


 


Blood Pressure 83/59 86/63


 


O2 Sat by Pulse 90 





Oximetry  














- General Appearance


General appearance: other (awake)


EENT: ATNC


Neck: no JVD


Respiratory: Present: Decreased Breath Sounds


Cardiology: regular, S1S2


Gastrointestinal: other (soft, nasogastric tube in place to suction)


Integumentary: warm and dry


Neurologic: alert and oriented x3


Musculoskeletal: other (no edema to BLE)


Psychiatric: cooperative





- Lab





                                 06/20/21 05:24





                                 06/20/21 05:24


                             Most recent lab results











Calcium  8.9 mg/dL (8.4-10.2)   06/20/21  05:24    


 


Phosphorus  5.40 mg/dL (2.5-4.5)  H  06/20/21  05:24    


 


Magnesium  2.90 mg/dL (1.7-2.3)  H  06/20/21  05:24    


 


Urine Creatinine  198.5 mg/dL (0.1-20.0)  H  06/19/21  Unknown


 


Urine Sodium  11 mmol/L  06/19/21  Unknown


 


Urine Total Protein  38 mg/dL (5-11.8)  H  06/19/21  Unknown














Medications & Allergies





- Medications


Allergies/Adverse Reactions: 


                                    Allergies





No Known Allergies Allergy (Verified 06/19/21 15:01)


   








Home Medications: 


                                Home Medications











 Medication  Instructions  Recorded  Confirmed  Last Taken  Type


 


AtorvaSTATin 40 mg PO QHS 06/11/20 06/19/21 Unknown History


 


Metoprolol Succinate 50 mg PO DAILY 06/11/20 06/19/21 1 Day Ago History





    ~06/18/21 











Active Medications: 














Generic Name Dose Route Start Last Admin





  Trade Name Freq  PRN Reason Stop Dose Admin


 


Ceftriaxone Sodium  1 gm in 50 mls @ 100 mls/hr  06/19/21 12:00  06/19/21 11:56





  Rocephin/Ns 1 Gm/50 Ml  IV   100 mls/hr





  Q24H LEO   Administration





  Protocol  


 


Sodium Chloride  1,000 mls @ 150 mls/hr  06/19/21 11:30  06/19/21 14:36





  Nacl 0.9% 1000 Ml  IV   150 mls/hr





  AS DIRECT LEO   Administration


 


Potassium Chloride  10 meq in 100 mls @ 100 mls/hr  06/20/21 10:00 





  Kcl 10meq/100ml  IV  06/20/21 13:59 





  Q1H [facility]O  


 


Morphine Sulfate  2 mg  06/19/21 11:30  06/19/21 17:32





  Morphine 2 Mg/1 Ml Inj  IV   2 mg





  Q3H PRN   Administration





  Pain, Moderate (4-6)  


 


Ondansetron HCl  4 mg  06/19/21 11:29  06/20/21 02:43





  Ondansetron 4 Mg/2 Ml Inj  IV   4 mg





  Q6H PRN   Administration





  N/V unrelieved by Reglan

## 2021-06-20 NOTE — PROGRESS NOTE
Assessment and Plan


Assessment and plan: 





High-grade small bowel obstruction


-N.p.o., IV fluids, pain control


-during my examination patient was given pain medication and general pain 

resolved


-General surgery consulted


-We will continue to monitor





Hypotension


-Likely due to poor oral intake, persistent nausea and vomiting


-Patient was given bolus of IV fluids and corrected


-We will continue with normal saline


-Held blood pressure medications





Acute renal failure


-Likely due to dehydration, patient is on IV fluids 


-Nephrology consulted





History of aortic aneurysm repair


-CT did not show acute abnormalities


-H&H is stable








DVT prophylaxis


-SCDs because patient may need surgery





CODE STATUS


-Full


Disposition; admit to medical floor.





Management plan was discussed with the patient and was in agreement with the 

plan of care.








6/20/2021


-Blood pressure was low this morning and was given a bolus of normal saline.  

Continue with normal saline at 150 mL/h


-Slight improvement in creatinine level, patient is hypokalemic and was given po

tassium


-Nephrology consult appreciated


-Patient was seen by general surgery and recommend NG tube decompression








History


Interval history: 





Patient was seen and evaluated this morning


Patient admitted for high-grade small bowel obstruction and ILEANA





Hospitalist Physical





- Physical exam


Narrative exam: 





 Not in cardiopulmonary distress. 


 The patient appeared well nourished and normally developed.


 Vital signs as documented.


 Head exam is unremarkable.


 No scleral icterus .


 Neck is without jugular venous distension, thyromegaly, or carotid bruits. 


 Lungs are clear to auscultation.


Cardiac exam reveals regular rate and  Rhythm. 


Abdominal exam reveals normal bowel sounds, nontender, no organomegaly.


Extremities are nonedematous and both femoral and pedal pulses are normal.


CNS: Alert and oriented 3.  No focal weakness.





- Constitutional


Vitals: 


                                        











Temp Pulse Resp BP Pulse Ox


 


 98.6 F   41 L  20   86/63   90 


 


 06/19/21 22:03  06/19/21 22:03  06/19/21 22:03  06/19/21 22:57  06/19/21 22:03














Results





- Labs


CBC & Chem 7: 


                                 06/20/21 05:24





                                 06/20/21 05:24


Labs: 


                             Laboratory Last Values











WBC  9.9 K/mm3 (4.5-11.0)   06/20/21  05:24    


 


RBC  4.78 M/mm3 (3.65-5.03)   06/20/21  05:24    


 


Hgb  16.1 gm/dl (11.8-15.2)  H  06/20/21  05:24    


 


Hct  46.3 % (35.5-45.6)  H  06/20/21  05:24    


 


MCV  97 fl (84-94)  H  06/20/21  05:24    


 


MCH  34 pg (28-32)  H  06/20/21  05:24    


 


MCHC  35 % (32-34)  H  06/20/21  05:24    


 


RDW  12.9 % (13.2-15.2)  L  06/20/21  05:24    


 


Plt Count  211 K/mm3 (140-440)   06/20/21  05:24    


 


Lymph % (Auto)  6.1 % (13.4-35.0)  L  06/20/21  05:24    


 


Mono % (Auto)  9.4 % (0.0-7.3)  H  06/20/21  05:24    


 


Eos % (Auto)  0.1 % (0.0-4.3)   06/20/21  05:24    


 


Baso % (Auto)  0.2 % (0.0-1.8)   06/20/21  05:24    


 


Lymph # (Auto)  0.6 K/mm3 (1.2-5.4)  L  06/20/21  05:24    


 


Mono # (Auto)  0.9 K/mm3 (0.0-0.8)  H  06/20/21  05:24    


 


Eos # (Auto)  0.0 K/mm3 (0.0-0.4)   06/20/21  05:24    


 


Baso # (Auto)  0.0 K/mm3 (0.0-0.1)   06/20/21  05:24    


 


Add Manual Diff  Complete   06/19/21  08:21    


 


Total Counted  100   06/19/21  08:21    


 


Seg Neutrophils %  84.2 % (40.0-70.0)  H  06/20/21  05:24    


 


Seg Neuts % (Manual)  81.0 % (40.0-70.0)  H  06/19/21  08:21    


 


Lymphocytes % (Manual)  6.0 % (13.4-35.0)  L  06/19/21  08:21    


 


Monocytes % (Manual)  12.0 % (0.0-7.3)  H  06/19/21  08:21    


 


Eosinophils % (Manual)  1.0 % (0.0-4.3)   06/19/21  08:21    


 


Nucleated RBC %  Not Reportable   06/19/21  08:21    


 


Seg Neutrophils #  8.4 K/mm3 (1.8-7.7)  H  06/20/21  05:24    


 


Seg Neutrophils # Man  7.0 K/mm3 (1.8-7.7)   06/19/21  08:21    


 


Band Neutrophils #  0.0 K/mm3  06/19/21  08:21    


 


Lymphocytes # (Manual)  0.5 K/mm3 (1.2-5.4)  L  06/19/21  08:21    


 


Abs React Lymphs (Man)  0.0 K/mm3  06/19/21  08:21    


 


Monocytes # (Manual)  1.0 K/mm3 (0.0-0.8)  H  06/19/21  08:21    


 


Eosinophils # (Manual)  0.1 K/mm3 (0.0-0.4)   06/19/21  08:21    


 


Basophils # (Manual)  0.0 K/mm3 (0.0-0.1)   06/19/21  08:21    


 


Metamyelocytes #  0.0 K/mm3  06/19/21  08:21    


 


Myelocytes #  0.0 K/mm3  06/19/21  08:21    


 


Promyelocytes #  0.0 K/mm3  06/19/21  08:21    


 


Blast Cells #  0.0 K/mm3  06/19/21  08:21    


 


WBC Morphology  Not Reportable   06/19/21  08:21    


 


WBC Morphology  TNR   06/19/21  08:21    


 


Hypersegmented Neuts  Not Reportable   06/19/21  08:21    


 


Hyposegmented Neuts  Not Reportable   06/19/21  08:21    


 


Hypogranular Neuts  Not Reportable   06/19/21  08:21    


 


Smudge Cells  Not Reportable   06/19/21  08:21    


 


Toxic Granulation  Not Reportable   06/19/21  08:21    


 


Toxic Vacuolation  Not Reportable   06/19/21  08:21    


 


Dohle Bodies  Not Reportable   06/19/21  08:21    


 


Pelger-Huet Anomaly  Not Reportable   06/19/21  08:21    


 


Pepito Rods  Not Reportable   06/19/21  08:21    


 


Platelet Estimate  Consistent w auto   06/19/21  08:21    


 


Clumped Platelets  Not Reportable   06/19/21  08:21    


 


Plt Clumps, EDTA  Not Reportable   06/19/21  08:21    


 


Large Platelets  Not Reportable   06/19/21  08:21    


 


Giant Platelets  Not Reportable   06/19/21  08:21    


 


Platelet Satelliting  Not Reportable   06/19/21  08:21    


 


Plt Morphology Comment  Not Reportable   06/19/21  08:21    


 


RBC Morphology  Not Reportable   06/19/21  08:21    


 


Dimorphic RBCs  Not Reportable   06/19/21  08:21    


 


Polychromasia  Not Reportable   06/19/21  08:21    


 


Hypochromasia  Not Reportable   06/19/21  08:21    


 


Poikilocytosis  Not Reportable   06/19/21  08:21    


 


Anisocytosis  Not Reportable   06/19/21  08:21    


 


Microcytosis  Not Reportable   06/19/21  08:21    


 


Macrocytosis  Not Reportable   06/19/21  08:21    


 


Spherocytes  Not Reportable   06/19/21  08:21    


 


Pappenheimer Bodies  Not Reportable   06/19/21  08:21    


 


Sickle Cells  Not Reportable   06/19/21  08:21    


 


Target Cells  Not Reportable   06/19/21  08:21    


 


Tear Drop Cells  Not Reportable   06/19/21  08:21    


 


Ovalocytes  Not Reportable   06/19/21  08:21    


 


Helmet Cells  Not Reportable   06/19/21  08:21    


 


Tse-Hagan Bodies  Not Reportable   06/19/21  08:21    


 


Cabot Rings  Not Reportable   06/19/21  08:21    


 


Primitivo Cells  Few   06/19/21  08:21    


 


Bite Cells  Not Reportable   06/19/21  08:21    


 


Crenated Cell  Not Reportable   06/19/21  08:21    


 


Elliptocytes  Few   06/19/21  08:21    


 


Acanthocytes (Spur)  Not Reportable   06/19/21  08:21    


 


Rouleaux  Not Reportable   06/19/21  08:21    


 


Hemoglobin C Crystals  Not Reportable   06/19/21  08:21    


 


Schistocytes  Rare   06/19/21  08:21    


 


Malaria parasites  Not Reportable   06/19/21  08:21    


 


Linus Bodies  Not Reportable   06/19/21  08:21    


 


Hem Pathologist Commnt  No   06/19/21  08:21    


 


Sodium  137 mmol/L (137-145)   06/20/21  05:24    


 


Potassium  3.0 mmol/L (3.6-5.0)  L  06/20/21  05:24    


 


Chloride  91.0 mmol/L ()  L  06/20/21  05:24    


 


Carbon Dioxide  28 mmol/L (22-30)   06/20/21  05:24    


 


Anion Gap  21 mmol/L  06/20/21  05:24    


 


BUN  85 mg/dL (9-20)  H  06/20/21  05:24    


 


Creatinine  2.8 mg/dL (0.8-1.3)  H  06/20/21  05:24    


 


Estimated GFR  27 ml/min  06/20/21  05:24    


 


BUN/Creatinine Ratio  30 %  06/20/21  05:24    


 


Glucose  95 mg/dL ()   06/20/21  05:24    


 


Calcium  8.9 mg/dL (8.4-10.2)   06/20/21  05:24    


 


Phosphorus  5.40 mg/dL (2.5-4.5)  H  06/20/21  05:24    


 


Magnesium  2.90 mg/dL (1.7-2.3)  H  06/20/21  05:24    


 


Total Bilirubin  0.80 mg/dL (0.1-1.2)   06/19/21  08:21    


 


Direct Bilirubin  0.2 mg/dL (0-0.2)   06/19/21  08:21    


 


Indirect Bilirubin  0.6 mg/dL  06/19/21  08:21    


 


AST  16 units/L (5-40)   06/19/21  08:21    


 


ALT  9 units/L (7-56)   06/19/21  08:21    


 


Alkaline Phosphatase  72 units/L ()   06/19/21  08:21    


 


Total Protein  8.0 g/dL (6.3-8.2)   06/19/21  08:21    


 


Albumin  4.3 g/dL (3.9-5)   06/19/21  08:21    


 


Albumin/Globulin Ratio  1.2 %  06/19/21  08:21    


 


Lipase  18 units/L (13-60)   06/19/21  08:21    


 


Urine Color  Ema  (Yellow)   06/19/21  09:23    


 


Urine Turbidity  Cloudy  (Clear)   06/19/21  09:23    


 


Urine pH  5.0  (5.0-7.0)   06/19/21  09:23    


 


Ur Specific Gravity  1.017  (1.003-1.030)   06/19/21  09:23    


 


Urine Protein  100 mg/dl mg/dL (Negative)   06/19/21  09:23    


 


Urine Glucose (UA)  Neg mg/dL (Negative)   06/19/21  09:23    


 


Urine Ketones  Neg mg/dL (Negative)   06/19/21  09:23    


 


Urine Blood  Mod  (Negative)   06/19/21  09:23    


 


Urine Nitrite  Neg  (Negative)   06/19/21  09:23    


 


Urine Bilirubin  Neg  (Negative)   06/19/21  09:23    


 


Urine Urobilinogen  2.0 mg/dL (<2.0)   06/19/21  09:23    


 


Ur Leukocyte Esterase  Neg  (Negative)   06/19/21  09:23    


 


Urine WBC (Auto)  9.0 /HPF (0.0-6.0)  H  06/19/21  09:23    


 


Urine RBC (Auto)  10.0 /HPF (0.0-6.0)   06/19/21  09:23    


 


U Epithel Cells (Auto)  2.0 /HPF (0-13.0)   06/19/21  09:23    


 


Hyaline Casts  23 /LPF  06/19/21  09:23    


 


Urine Mucus  2+ /HPF  06/19/21  09:23    


 


Urine Eosinophils  None seen  (None Seen)   06/19/21  Unknown


 


Urine Creatinine  198.5 mg/dL (0.1-20.0)  H  06/19/21  Unknown


 


Urine Microalbumin  2.6 mg/dL (0.1-34.0)   06/19/21  Unknown


 


Microalb/Creat Ratio  13.0 ug/mg  06/19/21  Unknown


 


Protein/Creatinin Ratio  0.19   06/19/21  Unknown


 


Urine Sodium  11 mmol/L  06/19/21  Unknown


 


Urine Total Protein  38 mg/dL (5-11.8)  H  06/19/21  Unknown











Constantino/IV: 


                                        





Voiding Method                   Urinal











Active Medications





- Current Medications


Current Medications: 














Generic Name Dose Route Start Last Admin





  Trade Name Freq  PRN Reason Stop Dose Admin


 


Ceftriaxone Sodium  1 gm in 50 mls @ 100 mls/hr  06/19/21 12:00  06/19/21 11:56





  Rocephin/Ns 1 Gm/50 Ml  IV   100 mls/hr





  Q24H LEO   Administration





  Protocol  


 


Sodium Chloride  1,000 mls @ 150 mls/hr  06/19/21 11:30  06/19/21 14:36





  Nacl 0.9% 1000 Ml  IV   150 mls/hr





  AS DIRECT LEO   Administration


 


Potassium Chloride  10 meq in 100 mls @ 100 mls/hr  06/20/21 10:00 





  Kcl 10meq/100ml  IV  06/20/21 13:59 





  Q1H LEO  


 


Morphine Sulfate  2 mg  06/19/21 11:30  06/19/21 17:32





  Morphine 2 Mg/1 Ml Inj  IV   2 mg





  Q3H PRN   Administration





  Pain, Moderate (4-6)  


 


Ondansetron HCl  4 mg  06/19/21 11:29  06/20/21 02:43





  Ondansetron 4 Mg/2 Ml Inj  IV   4 mg





  Q6H PRN   Administration





  N/V unrelieved by Reglan

## 2021-06-20 NOTE — XRAY REPORT
ABDOMEN 2 VIEWS



INDICATION / CLINICAL INFORMATION:

sbo.



COMPARISON: 

6/14/2020



FINDINGS:

BOWEL: Multiple loops of dilated small bowel with air-fluid levels

FREE AIR / EXTRALUMINAL GAS: None seen.

CALCIFICATIONS: No significant abnormal calcifications. 



ADDITIONAL FINDINGS: None.



LUNGS: Visualized lungs show no significant abnormality.



SKELETAL STRUCTURES: No significant abnormality.



IMPRESSION:

1. Small bowel obstruction



Signer Name: Christopher Mathews MD 

Signed: 6/20/2021 8:27 AM

Workstation Name: [id number]

## 2021-06-20 NOTE — PROGRESS NOTE
Assessment and Plan





- Patient Problems


(1) SBO (small bowel obstruction)


Current Visit: Yes   Status: Acute   


Plan to address problem: 


1) Continue NG decompression


 2) CBC, BMP and AXR tomorrow morning


 3) Exploratory laparotomy and KALPANA tomorrow if no improvement.








Subjective


Date of service: 06/20/21


Patient Reports: Positive: no new complaints, no flatus, no bowel movement 

(Feels about the same.)





Objective





- Abdomen


soft, bowel sounds hypoactive (NT, ND)





- Labs





                                 06/20/21 05:24





                                 06/20/21 15:49


                                 Diabetes panel











  06/20/21 06/20/21 Range/Units





  05:24 15:49 


 


Sodium  137  137  (137-145)  mmol/L


 


Potassium  3.0 L  3.3 L  (3.6-5.0)  mmol/L


 


Chloride  91.0 L  90.0 L  ()  mmol/L


 


Carbon Dioxide  28  30  (22-30)  mmol/L


 


BUN  85 H  89 H  (9-20)  mg/dL


 


Creatinine  2.8 H  3.2 H  (0.8-1.3)  mg/dL


 


Glucose  95  106 H  ()  mg/dL


 


Calcium  8.9  8.5  (8.4-10.2)  mg/dL








                                  Calcium panel











  06/20/21 06/20/21 Range/Units





  05:24 15:49 


 


Calcium  8.9  8.5  (8.4-10.2)  mg/dL


 


Phosphorus  5.40 H   (2.5-4.5)  mg/dL








                                 Pituitary panel











  06/20/21 06/20/21 Range/Units





  05:24 15:49 


 


Sodium  137  137  (137-145)  mmol/L


 


Potassium  3.0 L  3.3 L  (3.6-5.0)  mmol/L


 


Chloride  91.0 L  90.0 L  ()  mmol/L


 


Carbon Dioxide  28  30  (22-30)  mmol/L


 


BUN  85 H  89 H  (9-20)  mg/dL


 


Creatinine  2.8 H  3.2 H  (0.8-1.3)  mg/dL


 


Glucose  95  106 H  ()  mg/dL


 


Calcium  8.9  8.5  (8.4-10.2)  mg/dL








                                  Adrenal panel











  06/20/21 06/20/21 Range/Units





  05:24 15:49 


 


Sodium  137  137  (137-145)  mmol/L


 


Potassium  3.0 L  3.3 L  (3.6-5.0)  mmol/L


 


Chloride  91.0 L  90.0 L  ()  mmol/L


 


Carbon Dioxide  28  30  (22-30)  mmol/L


 


BUN  85 H  89 H  (9-20)  mg/dL


 


Creatinine  2.8 H  3.2 H  (0.8-1.3)  mg/dL


 


Glucose  95  106 H  ()  mg/dL


 


Calcium  8.9  8.5  (8.4-10.2)  mg/dL














- Imaging


Abdominal x-ray: report reviewed


Additional Studies: 





I's & O's reviewed.

## 2021-06-21 LAB
BUN SERPL-MCNC: 90 MG/DL (ref 9–20)
BUN/CREAT SERPL: 36 %
CALCIUM SERPL-MCNC: 8.6 MG/DL (ref 8.4–10.2)
HCT VFR BLD CALC: 45.5 % (ref 35.5–45.6)
HEMOLYSIS INDEX: 8
HGB BLD-MCNC: 15.8 GM/DL (ref 11.8–15.2)
MCHC RBC AUTO-ENTMCNC: 35 % (ref 32–34)
MCV RBC AUTO: 97 FL (ref 84–94)
PLATELET # BLD: 206 K/MM3 (ref 140–440)
RBC # BLD AUTO: 4.68 M/MM3 (ref 3.65–5.03)

## 2021-06-21 PROCEDURE — 0DN80ZZ RELEASE SMALL INTESTINE, OPEN APPROACH: ICD-10-PCS | Performed by: SURGERY

## 2021-06-21 RX ADMIN — HYDROMORPHONE HYDROCHLORIDE PRN MG: 1 INJECTION, SOLUTION INTRAMUSCULAR; INTRAVENOUS; SUBCUTANEOUS at 14:30

## 2021-06-21 RX ADMIN — HYDROMORPHONE HYDROCHLORIDE PRN MG: 1 INJECTION, SOLUTION INTRAMUSCULAR; INTRAVENOUS; SUBCUTANEOUS at 14:00

## 2021-06-21 RX ADMIN — MORPHINE SULFATE PRN MG: 2 INJECTION, SOLUTION INTRAMUSCULAR; INTRAVENOUS at 17:06

## 2021-06-21 RX ADMIN — POTASSIUM CHLORIDE SCH MLS/HR: 10 INJECTION, SOLUTION INTRAVENOUS at 10:51

## 2021-06-21 RX ADMIN — HYDROMORPHONE HYDROCHLORIDE PRN MG: 1 INJECTION, SOLUTION INTRAMUSCULAR; INTRAVENOUS; SUBCUTANEOUS at 14:20

## 2021-06-21 RX ADMIN — SODIUM CHLORIDE SCH MLS/HR: 0.9 INJECTION, SOLUTION INTRAVENOUS at 15:32

## 2021-06-21 RX ADMIN — POTASSIUM CHLORIDE SCH MLS/HR: 10 INJECTION, SOLUTION INTRAVENOUS at 15:32

## 2021-06-21 RX ADMIN — SODIUM CHLORIDE SCH MLS/HR: 0.9 INJECTION, SOLUTION INTRAVENOUS at 06:27

## 2021-06-21 RX ADMIN — POTASSIUM CHLORIDE SCH MLS/HR: 10 INJECTION, SOLUTION INTRAVENOUS at 18:32

## 2021-06-21 RX ADMIN — HYDROMORPHONE HYDROCHLORIDE PRN MG: 1 INJECTION, SOLUTION INTRAMUSCULAR; INTRAVENOUS; SUBCUTANEOUS at 14:10

## 2021-06-21 RX ADMIN — POTASSIUM CHLORIDE SCH MLS/HR: 10 INJECTION, SOLUTION INTRAVENOUS at 17:06

## 2021-06-21 NOTE — CAT SCAN REPORT
CT CHEST WITHOUT CONTRAST



INDICATION / CLINICAL INFORMATION: Right perihilar mass.



TECHNIQUE: Axial CT images were obtained through the chest without contrast. All CT scans at this [facility]
ation are performed using CT dose reduction for ALARA by means of automated exposure control. 



COMPARISON: Chest radiograph performed earlier today and CTA of the chest dated 11/11/2018



FINDINGS:



HEART: No significant abnormality.

CORONARY ARTERY CALCIFICATION: None.

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 The a
rch measures 4.1 cm in 2018.

MEDIASTINUM / ENRICO: No significant abnormality.

PLEURA: No pleural effusion. No pneumothorax.

LUNGS: There are patchy airspace opacities in the right upper lobe and in the right lower lobe some o
f which corresponds to the density seen on the recent chest radiograph. The appearance is characteris
tic of pneumonia



ADDITIONAL FINDINGS: None.



UPPER ABDOMEN: There is a nasogastric tube in the gastric fundus. The stomach is distended with air a
nd a small amount of fluid.



SKELETAL SYSTEM: No significant abnormality.



IMPRESSION:

1. There are patchy airspace opacities in the right upper and right lower lobe which account for the 
density seen on the chest imaging performed earlier today. The appearance is characteristic of pneumo
mary.

2. 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 This measured 4.1 cm
 on the prior study from 2018.



Signer Name: Saurabh Mir MD 

Signed: 6/21/2021 11:04 AM

Workstation Name: [id number]

## 2021-06-21 NOTE — ANESTHESIA DAY OF SURGERY
<KELLI AHMADI - Last Filed: 06/21/21 09:54>





Anesthesia Day of Surgery





- Day of Surgery


Patient Examined: Yes


Patient H&P Reviewed: Yes


Patient is NPO: Yes


Beta Blockers: Yes





<WHIT FREITAS - Last Filed: 06/21/21 13:02>





Anesthesia Day of Surgery





- Day of Surgery


Beta Blockers: No

## 2021-06-21 NOTE — PROCEDURE NOTE
Date of procedure: 06/21/21


Pre-op diagnosis: SBO


Post-op diagnosis: same


Procedure: 





Enterolysis





Description of procedure:  Pt was placed supine on the OR table.  GETA was 

administered.  Abdomen was prepped and draped.  Peritoneal cavity was entered 

via a vertical midline incision.  The band causing the obstruction was quickly 

identified and was lysed.  Bowel proximal to the band was dilated and bowel 

distal to the band was decompressed.  Bowel was returned to the peritoneal cavi

ty.  Midline fascia was approximated with a running, looped #1 PDS.  Skin was 

approximated with staples.  Xeroform was applied to the incision followed by dry

4 X 4's secured with tape.  Pt was extubated in the OR.  Pt tolerated the 

procedure well.  Pt was taken to PACU in stable condition.


Anesthesia: GETA


Surgeon: KIERA WOOTEN


Estimated blood loss: minimal


Pathology: none


Condition: stable


Disposition: PACU

## 2021-06-21 NOTE — XRAY REPORT
ABDOMEN 2 VIEWS



INDICATION / CLINICAL INFORMATION:

sbo.



COMPARISON: 

June 20 2021



FINDINGS:



TUBES / LINES: Interval placement of NG tube with its tip projected in appropriate position of left u
pper quadrant abdomen.

BOWEL GAS PATTERN: Worsened dilation of small bowel noted of the central abdomen, the largest now raymundo
suring 7 cm in cross sectional diameter. 

FREE AIR / EXTRALUMINAL GAS: None seen.



ADDITIONAL FINDINGS: Postsurgical findings noted over the upper abdomen.



CHEST: 

1.9 cm rounded opacity in the right perihilar region.



IMPRESSION:

1. Interval placement of NG tube noted in appropriate position.

2. Worsened mechanical small bowel obstruction with interval enlargement of dilated small bowel loops
 when compared to 6/20/2021.

3. 1.9 cm rounded opacity in the right perihilar region concerning for pulmonary nodule. Noncontrast 
CT chest is recommended for further evaluation.



Signer Name: Alexander Barnard MD 

Signed: 6/21/2021 9:15 AM

Workstation Name: [id number]

## 2021-06-21 NOTE — ANESTHESIA CONSULTATION
<MICHELLEKELLI VALLADARES - Last Filed: 06/21/21 09:51>





Anesthesia Consult and Med Hx


Date of service: 06/21/21





- Airway


Anesthetic Teeth Evaluation: Good, Bridges (upper front)


ROM Head & Neck: Adequate


Mental/Hyoid Distance: Adequate


Mallampati Class: Class II


Intubation Access Assessment: Probably Good





- Pre-Operative Health Status


ASA Pre-Surgery Classification: ASA3


Proposed Anesthetic Plan: General





- Pulmonary


Hx Asthma: No


COPD: No


Hx Pneumonia: No





- Cardiovascular System


Hx Hypertension: Yes


Hx Coronary Artery Disease: No (high cholesterol)


Hx Peripheral Vascular Disease: Yes (h/o aortic aneurysm repair)





- Gastrointestinal


Hx Gastroesophageal Reflux Disease: Yes (small bowel obstruction)





- Endocrine


Hx End Stage Renal Disease: No





- Other Systems


Hx Cancer: No





<WHIT FREITAS - Last Filed: 06/21/21 13:03>





Anesthesia Consult and Med Hx





- Airway


Anesthetic Teeth Evaluation: Bridges


ROM Head & Neck: Adequate


Mental/Hyoid Distance: Adequate


Mallampati Class: Class II


Intubation Access Assessment: Probably Good





- Pre-Operative Health Status


ASA Pre-Surgery Classification: ASA3


Proposed Anesthetic Plan: General





- Cardiovascular System


Hx Hypertension: Yes


Hx Peripheral Vascular Disease: Yes





- Gastrointestinal


Hx Gastroesophageal Reflux Disease: No (SBO)





- Endocrine


Hx Renal Disease: Yes (ILEANA)





- Additional Comments


Anesthesia Medical History Comments: Scheduled for ex-lap. Patient confirmed OK 

to receive blood and signed written consent. Will send type and screen. Plan 

GETA w/ stnd ASA monitors.

## 2021-06-21 NOTE — PROGRESS NOTE
Assessment and Plan








Assessment:


High Grade Small Bowel Obstruction


ILEANA possibly 2/2 prerenal etiology in setting of vomiting and poor oral intake, 

hypotension, no obstruction


Hypotension


Hypokalemia


Hx of AAA repair








Plan:


- Renal function reviewed, SCr level was 2.5 today, yesterday's SCr level was 

3.2, has good UOP of 1100 ml


- Pt had elevated SCr level of 2.3 on 6/11/20, but normalized on 6/14/20 to 0.9


- On 0.9% NS infusion at 150 ml/hr


- Replete potassium as needed, IV KCL ordered noted


- UA showed elevated rbc, no significant proteinuria, no urine eosinophils


- No hydronephrosis noted on CT AP w/o contrast study on 6/19/21


- On Abx. Currently NPO


- Renally dose medications


- Avoid nephrotoxic agents


- Constantino Catheter: No


- Renal plan reviewed by Dr. Parker








Subjective


Date of service: 06/21/21


Principal diagnosis: ARF


Interval history: 





Patient off floor for surgery





Objective





- Vital Signs


Vital signs: 


                               Vital Signs - 12hr











  06/21/21





  09:52


 


Temperature 97.9 F


 


Pulse Rate 98 H


 


Respiratory 20





Rate 


 


O2 Sat by Pulse 100





Oximetry 














- Lab





                                 06/21/21 05:07





                                 06/21/21 05:07


                             Most recent lab results











Calcium  8.6 mg/dL (8.4-10.2)   06/21/21  05:07    


 


Phosphorus  5.40 mg/dL (2.5-4.5)  H  06/20/21  05:24    


 


Magnesium  2.90 mg/dL (1.7-2.3)  H  06/20/21  05:24    


 


Urine Creatinine  198.5 mg/dL (0.1-20.0)  H  06/19/21  Unknown


 


Urine Sodium  11 mmol/L  06/19/21  Unknown


 


Urine Total Protein  38 mg/dL (5-11.8)  H  06/19/21  Unknown














Medications & Allergies





- Medications


Allergies/Adverse Reactions: 


                                    Allergies





No Known Allergies Allergy (Verified 06/19/21 15:01)


   








Home Medications: 


                                Home Medications











 Medication  Instructions  Recorded  Confirmed  Last Taken  Type


 


AtorvaSTATin 40 mg PO QHS 06/11/20 06/19/21 Unknown History


 


Metoprolol Succinate 50 mg PO DAILY 06/11/20 06/19/21 1 Day Ago History





    ~06/18/21 











Active Medications: 














Generic Name Dose Route Start Last Admin





  Trade Name Freq  PRN Reason Stop Dose Admin


 


Sodium Chloride  1,000 mls @ 150 mls/hr  06/19/21 11:30  06/21/21 06:27





  Nacl 0.9% 1000 Ml  IV   150 mls/hr





  AS DIRECT LEO   Administration


 


Potassium Chloride  10 meq in 100 mls @ 100 mls/hr  06/21/21 11:00  06/21/21 

10:51





  Kcl 10meq/100ml  IV  06/21/21 14:59  100 mls/hr





  Q1H LEO   Administration


 


Morphine Sulfate  2 mg  06/19/21 11:30  06/20/21 22:06





  Morphine 2 Mg/1 Ml Inj  IV   2 mg





  Q3H PRN   Administration





  Pain, Moderate (4-6)  


 


Ondansetron HCl  4 mg  06/19/21 11:29  06/20/21 02:43





  Ondansetron 4 Mg/2 Ml Inj  IV   4 mg





  Q6H PRN   Administration





  N/V unrelieved by Reglan

## 2021-06-21 NOTE — PROGRESS NOTE
Assessment and Plan


Assessment and plan: 





High-grade small bowel obstruction


-N.p.o., IV fluids, pain control


-during my examination patient was given pain medication and general pain 

resolved


-General surgery consulted


-We will continue to monitor





Hypotension


-Likely due to poor oral intake, persistent nausea and vomiting


-Patient was given bolus of IV fluids and corrected


-We will continue with normal saline


-Held blood pressure medications





Acute renal failure


-Likely due to dehydration, patient is on IV fluids 


-Nephrology consulted





History of aortic aneurysm repair


-CT did not show acute abnormalities


-H&H is stable








DVT prophylaxis


-SCDs because patient may need surgery





CODE STATUS


-Full


Disposition; admit to medical floor.





Management plan was discussed with the patient and was in agreement with the 

plan of care.








6/20/2021


-Blood pressure was low this morning and was given a bolus of normal saline.  

Continue with normal saline at 150 mL/h


-Slight improvement in creatinine level, patient is hypokalemic and was given po

tassium


-Nephrology consult appreciated


-Patient was seen by general surgery and recommend NG tube decompression





6/21/2021


-ILEANA is improving, continue with IV fluids.  Nephrology consult appreciated.


-Abdominal x-ray this morning showed worsening of small bowel obstruction, 

patient was evaluated by general surgery and considering to do laparotomy and 

lysis of adhesion, will follow with the surgeon


-Chest x-ray showed 1.7 cm mass on the right perihilar area, I ordered a CT 

chest without contrast


-Urine culture is negative and I stop IV antibiotic











History


Interval history: 





Patient was seen and evaluated this morning


No bowel movement, on NG tube decompression


Patient admitted for high-grade small bowel obstruction and ILEANA








Hospitalist Physical





- Physical exam


Narrative exam: 





 Not in cardiopulmonary distress. 


 The patient appeared well nourished and normally developed.


 Vital signs as documented.


 Head exam is unremarkable.


 No scleral icterus .


 Neck is without jugular venous distension, thyromegaly, or carotid bruits. 


 Lungs are clear to auscultation.


Cardiac exam reveals regular rate and  Rhythm. 


Abdominal exam reveals normal bowel sounds, nontender, no organomegaly.


Extremities are nonedematous and both femoral and pedal pulses are normal.


CNS: Alert and oriented 3.  No focal weakness.





- Constitutional


Vitals: 


                                        











Temp Pulse Resp BP Pulse Ox


 


 97.9 F   97 H  20   104/78   99 


 


 06/20/21 21:19  06/20/21 21:19  06/20/21 21:19  06/20/21 21:19  06/20/21 21:19














Results





- Labs


CBC & Chem 7: 


                                 06/21/21 05:07





                                 06/21/21 05:07


Labs: 


                             Laboratory Last Values











WBC  11.0 K/mm3 (4.5-11.0)   06/21/21  05:07    


 


RBC  4.68 M/mm3 (3.65-5.03)   06/21/21  05:07    


 


Hgb  15.8 gm/dl (11.8-15.2)  H  06/21/21  05:07    


 


Hct  45.5 % (35.5-45.6)   06/21/21  05:07    


 


MCV  97 fl (84-94)  H  06/21/21  05:07    


 


MCH  34 pg (28-32)  H  06/21/21  05:07    


 


MCHC  35 % (32-34)  H  06/21/21  05:07    


 


RDW  12.8 % (13.2-15.2)  L  06/21/21  05:07    


 


Plt Count  206 K/mm3 (140-440)   06/21/21  05:07    


 


Lymph % (Auto)  6.1 % (13.4-35.0)  L  06/20/21  05:24    


 


Mono % (Auto)  9.4 % (0.0-7.3)  H  06/20/21  05:24    


 


Eos % (Auto)  0.1 % (0.0-4.3)   06/20/21  05:24    


 


Baso % (Auto)  0.2 % (0.0-1.8)   06/20/21  05:24    


 


Lymph # (Auto)  0.6 K/mm3 (1.2-5.4)  L  06/20/21  05:24    


 


Mono # (Auto)  0.9 K/mm3 (0.0-0.8)  H  06/20/21  05:24    


 


Eos # (Auto)  0.0 K/mm3 (0.0-0.4)   06/20/21  05:24    


 


Baso # (Auto)  0.0 K/mm3 (0.0-0.1)   06/20/21  05:24    


 


Add Manual Diff  Complete   06/19/21  08:21    


 


Total Counted  100   06/19/21  08:21    


 


Seg Neutrophils %  84.2 % (40.0-70.0)  H  06/20/21  05:24    


 


Seg Neuts % (Manual)  81.0 % (40.0-70.0)  H  06/19/21  08:21    


 


Lymphocytes % (Manual)  6.0 % (13.4-35.0)  L  06/19/21  08:21    


 


Monocytes % (Manual)  12.0 % (0.0-7.3)  H  06/19/21  08:21    


 


Eosinophils % (Manual)  1.0 % (0.0-4.3)   06/19/21  08:21    


 


Nucleated RBC %  Not Reportable   06/19/21  08:21    


 


Seg Neutrophils #  8.4 K/mm3 (1.8-7.7)  H  06/20/21  05:24    


 


Seg Neutrophils # Man  7.0 K/mm3 (1.8-7.7)   06/19/21  08:21    


 


Band Neutrophils #  0.0 K/mm3  06/19/21  08:21    


 


Lymphocytes # (Manual)  0.5 K/mm3 (1.2-5.4)  L  06/19/21  08:21    


 


Abs React Lymphs (Man)  0.0 K/mm3  06/19/21  08:21    


 


Monocytes # (Manual)  1.0 K/mm3 (0.0-0.8)  H  06/19/21  08:21    


 


Eosinophils # (Manual)  0.1 K/mm3 (0.0-0.4)   06/19/21  08:21    


 


Basophils # (Manual)  0.0 K/mm3 (0.0-0.1)   06/19/21  08:21    


 


Metamyelocytes #  0.0 K/mm3  06/19/21  08:21    


 


Myelocytes #  0.0 K/mm3  06/19/21  08:21    


 


Promyelocytes #  0.0 K/mm3  06/19/21  08:21    


 


Blast Cells #  0.0 K/mm3  06/19/21  08:21    


 


WBC Morphology  Not Reportable   06/19/21  08:21    


 


WBC Morphology  TNR   06/19/21  08:21    


 


Hypersegmented Neuts  Not Reportable   06/19/21  08:21    


 


Hyposegmented Neuts  Not Reportable   06/19/21  08:21    


 


Hypogranular Neuts  Not Reportable   06/19/21  08:21    


 


Smudge Cells  Not Reportable   06/19/21  08:21    


 


Toxic Granulation  Not Reportable   06/19/21  08:21    


 


Toxic Vacuolation  Not Reportable   06/19/21  08:21    


 


Dohle Bodies  Not Reportable   06/19/21  08:21    


 


Pelger-Huet Anomaly  Not Reportable   06/19/21  08:21    


 


Pepito Rods  Not Reportable   06/19/21  08:21    


 


Platelet Estimate  Consistent w auto   06/19/21  08:21    


 


Clumped Platelets  Not Reportable   06/19/21  08:21    


 


Plt Clumps, EDTA  Not Reportable   06/19/21  08:21    


 


Large Platelets  Not Reportable   06/19/21  08:21    


 


Giant Platelets  Not Reportable   06/19/21  08:21    


 


Platelet Satelliting  Not Reportable   06/19/21  08:21    


 


Plt Morphology Comment  Not Reportable   06/19/21  08:21    


 


RBC Morphology  Not Reportable   06/19/21  08:21    


 


Dimorphic RBCs  Not Reportable   06/19/21  08:21    


 


Polychromasia  Not Reportable   06/19/21  08:21    


 


Hypochromasia  Not Reportable   06/19/21  08:21    


 


Poikilocytosis  Not Reportable   06/19/21  08:21    


 


Anisocytosis  Not Reportable   06/19/21  08:21    


 


Microcytosis  Not Reportable   06/19/21  08:21    


 


Macrocytosis  Not Reportable   06/19/21  08:21    


 


Spherocytes  Not Reportable   06/19/21  08:21    


 


Pappenheimer Bodies  Not Reportable   06/19/21  08:21    


 


Sickle Cells  Not Reportable   06/19/21  08:21    


 


Target Cells  Not Reportable   06/19/21  08:21    


 


Tear Drop Cells  Not Reportable   06/19/21  08:21    


 


Ovalocytes  Not Reportable   06/19/21  08:21    


 


Helmet Cells  Not Reportable   06/19/21  08:21    


 


Tse-Wymore Bodies  Not Reportable   06/19/21  08:21    


 


Cabot Rings  Not Reportable   06/19/21  08:21    


 


Memphis Cells  Few   06/19/21  08:21    


 


Bite Cells  Not Reportable   06/19/21  08:21    


 


Crenated Cell  Not Reportable   06/19/21  08:21    


 


Elliptocytes  Few   06/19/21  08:21    


 


Acanthocytes (Spur)  Not Reportable   06/19/21  08:21    


 


Rouleaux  Not Reportable   06/19/21  08:21    


 


Hemoglobin C Crystals  Not Reportable   06/19/21  08:21    


 


Schistocytes  Rare   06/19/21  08:21    


 


Malaria parasites  Not Reportable   06/19/21  08:21    


 


Linus Bodies  Not Reportable   06/19/21  08:21    


 


Hem Pathologist Commnt  No   06/19/21  08:21    


 


Sodium  138 mmol/L (137-145)   06/21/21  05:07    


 


Potassium  3.2 mmol/L (3.6-5.0)  L  06/21/21  05:07    


 


Chloride  93.5 mmol/L ()  L  06/21/21  05:07    


 


Carbon Dioxide  31 mmol/L (22-30)  H  06/21/21  05:07    


 


Anion Gap  17 mmol/L  06/21/21  05:07    


 


BUN  90 mg/dL (9-20)  H  06/21/21  05:07    


 


Creatinine  2.5 mg/dL (0.8-1.3)  H  06/21/21  05:07    


 


Estimated GFR  31 ml/min  06/21/21  05:07    


 


BUN/Creatinine Ratio  36 %  06/21/21  05:07    


 


Glucose  108 mg/dL ()  H  06/21/21  05:07    


 


Calcium  8.6 mg/dL (8.4-10.2)   06/21/21  05:07    


 


Phosphorus  5.40 mg/dL (2.5-4.5)  H  06/20/21  05:24    


 


Magnesium  2.90 mg/dL (1.7-2.3)  H  06/20/21  05:24    


 


Total Bilirubin  0.80 mg/dL (0.1-1.2)   06/19/21  08:21    


 


Direct Bilirubin  0.2 mg/dL (0-0.2)   06/19/21  08:21    


 


Indirect Bilirubin  0.6 mg/dL  06/19/21  08:21    


 


AST  16 units/L (5-40)   06/19/21  08:21    


 


ALT  9 units/L (7-56)   06/19/21  08:21    


 


Alkaline Phosphatase  72 units/L ()   06/19/21  08:21    


 


Total Protein  8.0 g/dL (6.3-8.2)   06/19/21  08:21    


 


Albumin  4.3 g/dL (3.9-5)   06/19/21  08:21    


 


Albumin/Globulin Ratio  1.2 %  06/19/21  08:21    


 


Lipase  18 units/L (13-60)   06/19/21  08:21    


 


Urine Color  Ema  (Yellow)   06/19/21  09:23    


 


Urine Turbidity  Cloudy  (Clear)   06/19/21  09:23    


 


Urine pH  5.0  (5.0-7.0)   06/19/21  09:23    


 


Ur Specific Gravity  1.017  (1.003-1.030)   06/19/21  09:23    


 


Urine Protein  100 mg/dl mg/dL (Negative)   06/19/21  09:23    


 


Urine Glucose (UA)  Neg mg/dL (Negative)   06/19/21  09:23    


 


Urine Ketones  Neg mg/dL (Negative)   06/19/21  09:23    


 


Urine Blood  Mod  (Negative)   06/19/21  09:23    


 


Urine Nitrite  Neg  (Negative)   06/19/21  09:23    


 


Urine Bilirubin  Neg  (Negative)   06/19/21  09:23    


 


Urine Urobilinogen  2.0 mg/dL (<2.0)   06/19/21  09:23    


 


Ur Leukocyte Esterase  Neg  (Negative)   06/19/21  09:23    


 


Urine WBC (Auto)  9.0 /HPF (0.0-6.0)  H  06/19/21  09:23    


 


Urine RBC (Auto)  10.0 /HPF (0.0-6.0)   06/19/21  09:23    


 


U Epithel Cells (Auto)  2.0 /HPF (0-13.0)   06/19/21  09:23    


 


Hyaline Casts  23 /LPF  06/19/21  09:23    


 


Urine Mucus  2+ /HPF  06/19/21  09:23    


 


Urine Eosinophils  None seen  (None Seen)   06/19/21  Unknown


 


Urine Creatinine  198.5 mg/dL (0.1-20.0)  H  06/19/21  Unknown


 


Urine Microalbumin  2.6 mg/dL (0.1-34.0)   06/19/21  Unknown


 


Microalb/Creat Ratio  13.0 ug/mg  06/19/21  Unknown


 


Protein/Creatinin Ratio  0.19   06/19/21  Unknown


 


Urine Sodium  11 mmol/L  06/19/21  Unknown


 


Urine Total Protein  38 mg/dL (5-11.8)  H  06/19/21  Unknown











Microbiology: 


Microbiology





06/19/21 09:23   Urine,Clean Catch   Urine Culture - Preliminary


                            NO GROWTH AFTER 24 HOURS








Constantino/IV: 


                                        





Voiding Method                   Urinal











Active Medications





- Current Medications


Current Medications: 














Generic Name Dose Route Start Last Admin





  Trade Name Freq  PRN Reason Stop Dose Admin


 


Ceftriaxone Sodium  1 gm in 50 mls @ 100 mls/hr  06/19/21 12:00  06/20/21 23:58





  Rocephin/Ns 1 Gm/50 Ml  IV   Infused





  Q24H LEO   Infusion





  Protocol  


 


Sodium Chloride  1,000 mls @ 150 mls/hr  06/19/21 11:30  06/21/21 06:27





  Nacl 0.9% 1000 Ml  IV   150 mls/hr





  AS DIRECT LEO   Administration


 


Potassium Chloride  10 meq in 100 mls @ 100 mls/hr  06/21/21 10:00 





  Kcl 10meq/100ml  IV  06/21/21 13:59 





  Q1H LEO  


 


Morphine Sulfate  2 mg  06/19/21 11:30  06/20/21 22:06





  Morphine 2 Mg/1 Ml Inj  IV   2 mg





  Q3H PRN   Administration





  Pain, Moderate (4-6)  


 


Ondansetron HCl  4 mg  06/19/21 11:29  06/20/21 02:43





  Ondansetron 4 Mg/2 Ml Inj  IV   4 mg





  Q6H PRN   Administration





  N/V unrelieved by Reglan

## 2021-06-22 LAB
BUN SERPL-MCNC: 63 MG/DL (ref 9–20)
BUN/CREAT SERPL: 45 %
CALCIUM SERPL-MCNC: 8.3 MG/DL (ref 8.4–10.2)
HCT VFR BLD CALC: 43.1 % (ref 35.5–45.6)
HEMOLYSIS INDEX: 3
HGB BLD-MCNC: 15.1 GM/DL (ref 11.8–15.2)
MCHC RBC AUTO-ENTMCNC: 35 % (ref 32–34)
MCV RBC AUTO: 97 FL (ref 84–94)
PLATELET # BLD: 211 K/MM3 (ref 140–440)
RBC # BLD AUTO: 4.46 M/MM3 (ref 3.65–5.03)

## 2021-06-22 RX ADMIN — MORPHINE SULFATE PRN MG: 2 INJECTION, SOLUTION INTRAMUSCULAR; INTRAVENOUS at 09:34

## 2021-06-22 RX ADMIN — SODIUM CHLORIDE SCH MLS/HR: 0.9 INJECTION, SOLUTION INTRAVENOUS at 09:35

## 2021-06-22 RX ADMIN — Medication PRN APPLIC: at 15:03

## 2021-06-22 RX ADMIN — MORPHINE SULFATE PRN MG: 2 INJECTION, SOLUTION INTRAMUSCULAR; INTRAVENOUS at 17:11

## 2021-06-22 RX ADMIN — SODIUM CHLORIDE SCH MLS/HR: 0.9 INJECTION, SOLUTION INTRAVENOUS at 02:13

## 2021-06-22 NOTE — PROGRESS NOTE
Assessment and Plan


High Grade Small Bowel Obstruction


ILEANA possibly 2/2 prerenal etiology in setting of vomiting and poor oral intake, 

hypotension, no obstruction


Hypotension


Hypokalemia


Hx of AAA repair








Plan:


- Cr is trending down, good UOP


- cont NS


- No hydronephrosis noted on CT AP w/o contrast study on 6/19/21


- On Abx. Currently NPO


- Renally dose medications


- Avoid nephrotoxic agents


- Constantino Catheter: No








Subjective


Date of service: 06/22/21


Principal diagnosis: ARF


Interval history: 


no active complaints, no bowel movement 








Objective





- Vital Signs


Vital signs: 


                               Vital Signs - 12hr











  06/22/21





  05:22


 


Temperature 98.4 F


 


Pulse Rate 94 H


 


Respiratory 18





Rate 


 


Blood Pressure 107/78


 


O2 Sat by Pulse 98





Oximetry 














- Lab





                                 06/22/21 07:51





                                 06/22/21 07:51


                             Most recent lab results











Calcium  8.3 mg/dL (8.4-10.2)  L  06/22/21  07:51    


 


Phosphorus  5.40 mg/dL (2.5-4.5)  H  06/20/21  05:24    


 


Magnesium  2.90 mg/dL (1.7-2.3)  H  06/20/21  05:24    


 


Urine Creatinine  198.5 mg/dL (0.1-20.0)  H  06/19/21  Unknown


 


Urine Sodium  11 mmol/L  06/19/21  Unknown


 


Urine Total Protein  38 mg/dL (5-11.8)  H  06/19/21  Unknown














Medications & Allergies





- Medications


Allergies/Adverse Reactions: 


                                    Allergies





No Known Allergies Allergy (Verified 06/19/21 15:01)


   








Home Medications: 


                                Home Medications











 Medication  Instructions  Recorded  Confirmed  Last Taken  Type


 


AtorvaSTATin 40 mg PO QHS 06/11/20 06/19/21 Unknown History


 


Metoprolol Succinate 50 mg PO DAILY 06/11/20 06/19/21 1 Day Ago History





    ~06/18/21 











Active Medications: 














Generic Name Dose Route Start Last Admin





  Trade Name Freq  PRN Reason Stop Dose Admin


 


Hydrophilic Ointment  1 applic  06/22/21 09:43 





  Lip Therapy Vaseline  TP  





  AS DIRECT PRN  





  Dry Lips  


 


Sodium Chloride  1,000 mls @ 150 mls/hr  06/19/21 11:30  06/22/21 09:35





  Nacl 0.9% 1000 Ml  IV   150 mls/hr





  AS DIRECT LEO   Administration


 


Morphine Sulfate  2 mg  06/19/21 11:30  06/22/21 09:34





  Morphine 2 Mg/1 Ml Inj  IV   2 mg





  Q3H PRN   Administration





  Pain, Moderate (4-6)  


 


Ondansetron HCl  4 mg  06/19/21 11:29  06/20/21 02:43





  Ondansetron 4 Mg/2 Ml Inj  IV   4 mg





  Q6H PRN   Administration





  N/V unrelieved by Reglan  


 


Ondansetron HCl  4 mg  06/21/21 14:03 





  Ondansetron 4 Mg/2 Ml Inj  IV  





  ONCE PRN  





  Nausea And Vomiting

## 2021-06-22 NOTE — PROGRESS NOTE
Assessment and Plan





- Patient Problems


(1) SBO (small bowel obstruction)


Current Visit: Yes   Status: Acute   


Plan to address problem: 


1) Continue NG


 2) CBC, BMP and AXR in the am








Subjective


Date of service: 06/22/21


Patient Reports: Positive: no new complaints, no flatus, no bowel movement





Objective


                               Vital Signs - 12hr











  06/21/21 06/22/21





  22:41 05:22


 


Temperature 98.9 F 98.4 F


 


Pulse Rate 90 94 H


 


Respiratory 20 18





Rate  


 


Blood Pressure 119/92 107/78


 


O2 Sat by Pulse 98 98





Oximetry  














- Abdomen


soft, bowel sounds hypoactive (Appropriately TTP)





- Labs





                                 06/21/21 05:07





                                 06/21/21 05:07

## 2021-06-22 NOTE — POST ANESTHESIA EVALUATION
- Post Anesthesia Evaluation


Patient Participated: Yes


Airway Patent: Yes


Stable Respiratory Function: Yes


Nausea/Vomiting: No


Temp > 96.8F: Yes


Pain Manageable: Yes


Adequeate Hydration: Yes


Anesthesia Complications: No


Block Receding Appropriately: Not Applicable


Patient on Ventilator: No


Other Comments: Patient denies any anesthesia complications. Medical Necessity Clause: This procedure was medically necessary because the lesions that were treated were: Consent: The patient's consent was obtained including but not limited to risks of crusting, scabbing, blistering, scarring, darker or lighter pigmentary change, recurrence, incomplete removal and infection. Post-Care Instructions: I reviewed with the patient in detail post-care instructions. Patient is to wear sunprotection, and avoid picking at any of the treated lesions. Pt may apply Vaseline to crusted or scabbing areas. Detail Level: Detailed Add 52 Modifier (Optional): no Medical Necessity Information: It is in your best interest to select a reason for this procedure from the list below. All of these items fulfill various CMS LCD requirements except the new and changing color options.

## 2021-06-22 NOTE — PROGRESS NOTE
Assessment and Plan


Assessment and plan: 


66-year-old -American male with past medical history significant for 

aortic aneurysm repair, hypertension, penile implant presented to the emergency 

department with complaints of abdominal pain that started 4 days ago.  Patient 

states the pain was sharp, 10 out of 10 intensity, with no radiation, no 

aggravating or alleviating factors identified, patient has associated persistent

nausea and vomiting.  She was not eating and drinking for the last 4 days.  

Patient did not have any bowel movement, could not pass gas.  Patient is to call

his vascular surgeon and told him to go to the emergency department, his 

vascular surgeon stated it is not a vascular problem.


   In the emergency department patient was hypotensive and was given bolus of IV

fluids and blood pressure was corrected.  CT abdomen and pelvis showed small 

bowel obstruction with transition point.  General surgery was consulted.  

Patient has elevated BUN and creatinine and nephrology was consulted.  Patient 

admitted to the floor for further evaluation and management.








High-grade small bowel obstruction


-N.p.o., IV fluids, pain control


-during my examination patient was given pain medication and general pain 

resolved


-General surgery consulted


-We will continue to monitor





Hypotension


-Likely due to poor oral intake, persistent nausea and vomiting


-Patient was given bolus of IV fluids and corrected


-We will continue with normal saline


-Held blood pressure medications





Acute renal failure


-Likely due to dehydration, patient is on IV fluids 


-Nephrology consulted





History of aortic aneurysm repair


-CT did not show acute abnormalities


-H&H is stable








DVT prophylaxis


-SCDs because patient may need surgery





CODE STATUS


-Full


Disposition; admit to medical floor.





Management plan was discussed with the patient and was in agreement with the 

plan of care.








2021


-Blood pressure was low this morning and was given a bolus of normal saline.  

Continue with normal saline at 150 mL/h


-Slight improvement in creatinine level, patient is hypokalemic and was given 

potassium


-Nephrology consult appreciated


-Patient was seen by general surgery and recommend NG tube decompression





2021


-ILEANA is improving, continue with IV fluids.  Nephrology consult appreciated.


-Abdominal x-ray this morning showed worsening of small bowel obstruction, 

patient was evaluated by general surgery and considering to do laparotomy and 

lysis of adhesion, will follow with the surgeon


-Chest x-ray showed 1.7 cm mass on the right perihilar area, I ordered a CT 

chest without contrast


-Urine culture is negative and I stop IV antibiotic








: s/p EX LAP, Continue supportive care, Awaiting surgical re-evaluation, 

Renal function showing some improvement. Continue to monitor. 





History


Interval history: 


Patient was seen and evaluated this morning, No bowel movement, on NG tube 

decompression


Patient admitted for high-grade small bowel obstruction and ILEANA








Hospitalist Physical





- Physical exam


Narrative exam: 


 Not in cardiopulmonary distress. 


 The patient appeared well nourished and normally developed.


 Vital signs as documented.


 Head exam is unremarkable.


 No scleral icterus .


 Neck is without jugular venous distension, thyromegaly, or carotid bruits. 


 Lungs are clear to auscultation.


Cardiac exam reveals regular rate and  Rhythm. 


Abdominal exam reveals normal bowel sounds, nontender, no organomegaly.


Extremities are nonedematous and both femoral and pedal pulses are normal.


CNS: Alert and oriented 3.  No focal weakness.

















- Constitutional


Vitals: 


                                        











Temp Pulse Resp BP Pulse Ox


 


 98.4 F   94 H  18   107/78   98 


 


 21 05:22  21 05:22  21 05:22  21 05:22  21 05:22














Results





- Labs


CBC & Chem 7: 


                                 21 07:51





                                 21 07:51


Labs: 


                             Laboratory Last Values











WBC  11.0 K/mm3 (4.5-11.0)   21  07:51    


 


RBC  4.46 M/mm3 (3.65-5.03)   21  07:51    


 


Hgb  15.1 gm/dl (11.8-15.2)   21  07:51    


 


Hct  43.1 % (35.5-45.6)   21  07:51    


 


MCV  97 fl (84-94)  H  21  07:51    


 


MCH  34 pg (28-32)  H  21  07:51    


 


MCHC  35 % (32-34)  H  21  07:51    


 


RDW  12.8 % (13.2-15.2)  L  21  07:51    


 


Plt Count  211 K/mm3 (140-440)   21  07:51    


 


Lymph % (Auto)  6.1 % (13.4-35.0)  L  21  05:24    


 


Mono % (Auto)  9.4 % (0.0-7.3)  H  21  05:24    


 


Eos % (Auto)  0.1 % (0.0-4.3)   21  05:24    


 


Baso % (Auto)  0.2 % (0.0-1.8)   21  05:24    


 


Lymph # (Auto)  0.6 K/mm3 (1.2-5.4)  L  21  05:24    


 


Mono # (Auto)  0.9 K/mm3 (0.0-0.8)  H  21  05:24    


 


Eos # (Auto)  0.0 K/mm3 (0.0-0.4)   21  05:24    


 


Baso # (Auto)  0.0 K/mm3 (0.0-0.1)   21  05:24    


 


Add Manual Diff  Complete   21  08:21    


 


Total Counted  100   21  08:21    


 


Seg Neutrophils %  84.2 % (40.0-70.0)  H  21  05:24    


 


Seg Neuts % (Manual)  81.0 % (40.0-70.0)  H  21  08:21    


 


Lymphocytes % (Manual)  6.0 % (13.4-35.0)  L  21  08:21    


 


Monocytes % (Manual)  12.0 % (0.0-7.3)  H  21  08:21    


 


Eosinophils % (Manual)  1.0 % (0.0-4.3)   21  08:21    


 


Nucleated RBC %  Not Reportable   21  08:21    


 


Seg Neutrophils #  8.4 K/mm3 (1.8-7.7)  H  21  05:24    


 


Seg Neutrophils # Man  7.0 K/mm3 (1.8-7.7)   21  08:21    


 


Band Neutrophils #  0.0 K/mm3  21  08:21    


 


Lymphocytes # (Manual)  0.5 K/mm3 (1.2-5.4)  L  21  08:21    


 


Abs React Lymphs (Man)  0.0 K/mm3  21  08:21    


 


Monocytes # (Manual)  1.0 K/mm3 (0.0-0.8)  H  21  08:21    


 


Eosinophils # (Manual)  0.1 K/mm3 (0.0-0.4)   21  08:21    


 


Basophils # (Manual)  0.0 K/mm3 (0.0-0.1)   21  08:21    


 


Metamyelocytes #  0.0 K/mm3  21  08:21    


 


Myelocytes #  0.0 K/mm3  21  08:21    


 


Promyelocytes #  0.0 K/mm3  21  08:21    


 


Blast Cells #  0.0 K/mm3  21  08:21    


 


WBC Morphology  Not Reportable   21  08:21    


 


WBC Morphology  TNR   21  08:21    


 


Hypersegmented Neuts  Not Reportable   21  08:21    


 


Hyposegmented Neuts  Not Reportable   21  08:21    


 


Hypogranular Neuts  Not Reportable   21  08:21    


 


Smudge Cells  Not Reportable   21  08:21    


 


Toxic Granulation  Not Reportable   21  08:21    


 


Toxic Vacuolation  Not Reportable   21  08:21    


 


Dohle Bodies  Not Reportable   21  08:21    


 


Pelger-Huet Anomaly  Not Reportable   21  08:21    


 


Pepito Rods  Not Reportable   21  08:21    


 


Platelet Estimate  Consistent w auto   21  08:21    


 


Clumped Platelets  Not Reportable   21  08:21    


 


Plt Clumps, EDTA  Not Reportable   21  08:21    


 


Large Platelets  Not Reportable   21  08:21    


 


Giant Platelets  Not Reportable   21  08:21    


 


Platelet Satelliting  Not Reportable   21  08:21    


 


Plt Morphology Comment  Not Reportable   21  08:21    


 


RBC Morphology  Not Reportable   21  08:21    


 


Dimorphic RBCs  Not Reportable   21  08:21    


 


Polychromasia  Not Reportable   21  08:21    


 


Hypochromasia  Not Reportable   21  08:21    


 


Poikilocytosis  Not Reportable   21  08:21    


 


Anisocytosis  Not Reportable   21  08:21    


 


Microcytosis  Not Reportable   21  08:21    


 


Macrocytosis  Not Reportable   21  08:21    


 


Spherocytes  Not Reportable   21  08:21    


 


Pappenheimer Bodies  Not Reportable   21  08:21    


 


Sickle Cells  Not Reportable   21  08:21    


 


Target Cells  Not Reportable   21  08:21    


 


Tear Drop Cells  Not Reportable   21  08:21    


 


Ovalocytes  Not Reportable   21  08:21    


 


Helmet Cells  Not Reportable   21  08:21    


 


[place] Bodies  Not Reportable   21  08:21    


 


Cabot Rings  Not Reportable   21  08:21    


 


Williams Cells  Few   21  08:21    


 


Bite Cells  Not Reportable   21  08:21    


 


Crenated Cell  Not Reportable   21  08:21    


 


Elliptocytes  Few   21  08:21    


 


Acanthocytes (Spur)  Not Reportable   21  08:21    


 


Rouleaux  Not Reportable   21  08:21    


 


Hemoglobin C Crystals  Not Reportable   21  08:21    


 


Schistocytes  Rare   21  08:21    


 


Malaria parasites  Not Reportable   21  08:21    


 


Linus Bodies  Not Reportable   21  08:21    


 


Hem Pathologist Commnt  No   21  08:21    


 


Sodium  142 mmol/L (137-145)   21  07:51    


 


Potassium  3.8 mmol/L (3.6-5.0)   21  07:51    


 


Chloride  106.1 mmol/L ()   21  07:51    


 


Carbon Dioxide  27 mmol/L (22-30)   21  07:51    


 


Anion Gap  13 mmol/L  21  07:51    


 


BUN  63 mg/dL (9-20)  H  21  07:51    


 


Creatinine  1.4 mg/dL (0.8-1.3)  H  21  07:51    


 


Estimated GFR  > 60 ml/min  21  07:51    


 


BUN/Creatinine Ratio  45 %  21  07:51    


 


Glucose  95 mg/dL ()   21  07:51    


 


Calcium  8.3 mg/dL (8.4-10.2)  L  21  07:51    


 


Phosphorus  5.40 mg/dL (2.5-4.5)  H  21  05:24    


 


Magnesium  2.90 mg/dL (1.7-2.3)  H  21  05:24    


 


Total Bilirubin  0.80 mg/dL (0.1-1.2)   21  08:21    


 


Direct Bilirubin  0.2 mg/dL (0-0.2)   21  08:21    


 


Indirect Bilirubin  0.6 mg/dL  21  08:21    


 


AST  16 units/L (5-40)   21  08:21    


 


ALT  9 units/L (7-56)   21  08:21    


 


Alkaline Phosphatase  72 units/L ()   21  08:21    


 


Total Protein  8.0 g/dL (6.3-8.2)   21  08:21    


 


Albumin  4.3 g/dL (3.9-5)   21  08:21    


 


Albumin/Globulin Ratio  1.2 %  21  08:21    


 


Lipase  18 units/L (13-60)   21  08:21    


 


Urine Color  Ema  (Yellow)   21  09:23    


 


Urine Turbidity  Cloudy  (Clear)   21  09:23    


 


Urine pH  5.0  (5.0-7.0)   21  09:23    


 


Ur Specific Gravity  1.017  (1.003-1.030)   21  09:23    


 


Urine Protein  100 mg/dl mg/dL (Negative)   21  09:23    


 


Urine Glucose (UA)  Neg mg/dL (Negative)   21  09:23    


 


Urine Ketones  Neg mg/dL (Negative)   21  09:23    


 


Urine Blood  Mod  (Negative)   21  09:23    


 


Urine Nitrite  Neg  (Negative)   21  09:23    


 


Urine Bilirubin  Neg  (Negative)   21  09:23    


 


Urine Urobilinogen  2.0 mg/dL (<2.0)   21  09:23    


 


Ur Leukocyte Esterase  Neg  (Negative)   21  09:23    


 


Urine WBC (Auto)  9.0 /HPF (0.0-6.0)  H  21  09:23    


 


Urine RBC (Auto)  10.0 /HPF (0.0-6.0)   21  09:23    


 


U Epithel Cells (Auto)  2.0 /HPF (0-13.0)   21  09:23    


 


Hyaline Casts  23 /LPF  21  09:23    


 


Urine Mucus  2+ /HPF  21  09:23    


 


Urine Eosinophils  None seen  (None Seen)   21  Unknown


 


Urine Creatinine  198.5 mg/dL (0.1-20.0)  H  21  Unknown


 


Urine Microalbumin  2.6 mg/dL (0.1-34.0)   21  Unknown


 


Microalb/Creat Ratio  13.0 ug/mg  21  Unknown


 


Protein/Creatinin Ratio  0.19   21  Unknown


 


Urine Sodium  11 mmol/L  21  Unknown


 


Urine Total Protein  38 mg/dL (5-11.8)  H  21  Unknown


 


Blood Type  A POSITIVE   21  12:15    


 


Antibody Screen  Negative   21  12:15    











Microbiology: 


Microbiology





21 09:23   Urine,Clean Catch   Urine Culture - Final








Constantino/IV: 


                                        





Voiding Method                   Indwelling Catheter











Active Medications





- Current Medications


Current Medications: 














Generic Name Dose Route Start Last Admin





  Trade Name Freq  PRN Reason Stop Dose Admin


 


Hydrophilic Ointment  1 applic  21 09:43 





  Lip Therapy Vaseline  TP  





  AS DIRECT PRN  





  Dry Lips  


 


Sodium Chloride  1,000 mls @ 150 mls/hr  21 11:30  21 09:35





  Nacl 0.9% 1000 Ml  IV   150 mls/hr





  AS DIRECT LEO   Administration


 


Morphine Sulfate  2 mg  21 11:30  21 09:34





  Morphine 2 Mg/1 Ml Inj  IV   2 mg





  Q3H PRN   Administration





  Pain, Moderate (4-6)  


 


Ondansetron HCl  4 mg  21 11:29  21 02:43





  Ondansetron 4 Mg/2 Ml Inj  IV   4 mg





  Q6H PRN   Administration





  N/V unrelieved by Reglan  


 


Ondansetron HCl  4 mg  21 14:03 





  Ondansetron 4 Mg/2 Ml Inj  IV  





  ONCE PRN  





  Nausea And Vomiting  














Nutrition/Malnutrition Assess





- Dietary Evaluation


Nutrition/Malnutrition Findings: 


                                        





Nutrition Notes                                            Start:  21 

11:22


Freq:                                                      Status: Active       




Protocol:                                                                       




 Document     21 11:22  KEMI  (Rec: 21 11:27  [facility]  [id number])


 Nutrition Notes


     Need for Assessment generated from:         RN Referral


     Initial or Follow up                        Assessment


     Current Diagnosis                           Acute Kidney Injury,


                                                 Hypertension,Small Bowel


                                                 Obstruction


     Current Diet                                NPO


     Labs/Tests                                  K 3.2


                                                 BUN 90


                                                 Cr 2.5


     Pertinent Medications                       10mEq KCl at 100ml/hr x 4 bags


                                                 , NS at 150ml/hr


     Height                                      5 ft 11 in


     Weight                                      74 kg


     Ideal Body Weight (kg)                      78.18


     BMI                                         22.7


     Weight Status                               Appropriate


     Subjective/Other Information                Pt screened for new onsest of


                                                 DM (not indicated in PMHx and


                                                 no medications ordered; BG


                                                 labs WNL) and skin risk (


                                                 Lars score: 17).  Pt


                                                 admitted with SBO; NGT to


                                                 suction at this time.


     Burn                                        Absent


     Trauma                                      Absent


     Minimum of two criteria                     No


     #1


      Nutrition Diagnosis                        Altered GI function


      Etiology                                   SBO


      As Evidenced by Signs and Symptoms         pt NPO and unable to tolerate


                                                 PO intake x 4 days PTA


     Is patient on ventilator?                   No


     Is Patient Ambulatory and/or Out of Bed     Yes


     REE-(Collin-St. Jeor-ambulatory/OOB) [     [id number]


      [id number]]                               


     Calculation Used for Recommendations        Collin-St Jeor


     Additional Notes                            Pro needs 0.8-1.2g/k-89g/


                                                 day


                                                 Fluid needs 1ml/kcal


 Nutrition Intervention


     Change Diet Order:                          Diet advancement when


                                                 medically feasible


     Goal #1                                     Advance diet to meet nutrient


                                                 needs


     Anticipated Discharge Needs:                Unable to identify at this


                                                 time


     Follow-Up By:                               21


     Additional Comments                         F/U: diet advancement, POC

## 2021-06-23 LAB
BASOPHILS # (AUTO): 0 K/MM3 (ref 0–0.1)
BASOPHILS NFR BLD AUTO: 0.3 % (ref 0–1.8)
BUN SERPL-MCNC: 42 MG/DL (ref 9–20)
BUN/CREAT SERPL: 38 %
CALCIUM SERPL-MCNC: 8.1 MG/DL (ref 8.4–10.2)
EOSINOPHIL # BLD AUTO: 0.2 K/MM3 (ref 0–0.4)
EOSINOPHIL NFR BLD AUTO: 1.9 % (ref 0–4.3)
HCT VFR BLD CALC: 46.6 % (ref 35.5–45.6)
HEMOLYSIS INDEX: 2
HGB BLD-MCNC: 15.5 GM/DL (ref 11.8–15.2)
LYMPHOCYTES # BLD AUTO: 1.2 K/MM3 (ref 1.2–5.4)
LYMPHOCYTES NFR BLD AUTO: 12.1 % (ref 13.4–35)
MCHC RBC AUTO-ENTMCNC: 33 % (ref 32–34)
MCV RBC AUTO: 99 FL (ref 84–94)
MONOCYTES # (AUTO): 1.5 K/MM3 (ref 0–0.8)
MONOCYTES % (AUTO): 14.8 % (ref 0–7.3)
PLATELET # BLD: 227 K/MM3 (ref 140–440)
RBC # BLD AUTO: 4.73 M/MM3 (ref 3.65–5.03)

## 2021-06-23 RX ADMIN — ENOXAPARIN SODIUM SCH: 100 INJECTION SUBCUTANEOUS at 23:36

## 2021-06-23 RX ADMIN — SODIUM CHLORIDE SCH MLS/HR: 0.9 INJECTION, SOLUTION INTRAVENOUS at 21:54

## 2021-06-23 RX ADMIN — SODIUM CHLORIDE SCH MLS/HR: 0.9 INJECTION, SOLUTION INTRAVENOUS at 03:21

## 2021-06-23 RX ADMIN — ENOXAPARIN SODIUM SCH MG: 100 INJECTION SUBCUTANEOUS at 17:40

## 2021-06-23 RX ADMIN — SODIUM CHLORIDE SCH MLS/HR: 0.9 INJECTION, SOLUTION INTRAVENOUS at 07:06

## 2021-06-23 RX ADMIN — ENOXAPARIN SODIUM SCH MG: 100 INJECTION SUBCUTANEOUS at 21:53

## 2021-06-23 NOTE — PROGRESS NOTE
Assessment and Plan





- Patient Problems


(1) SBO (small bowel obstruction)


Current Visit: Yes   Status: Acute   


Plan to address problem: 


1) Continue NG


 2) CBC, BMP and AXR tomorrow


 3) Ambulate in the halls








Subjective


Date of service: 06/23/21


Patient Reports: Positive: no new complaints, feels better, pain is less, 

flatus, no bowel movement





Objective


                               Vital Signs - 12hr











  06/22/21 06/22/21 06/23/21





  23:22 23:24 00:01


 


Temperature   


 


Pulse Rate 127 H 122 H 131 H


 


Respiratory   18





Rate   


 


Blood Pressure   93/66


 


O2 Sat by Pulse 96 95 94





Oximetry   














  06/23/21 06/23/21





  04:45 04:51


 


Temperature 98.3 F 


 


Pulse Rate 131 H 


 


Respiratory 18 





Rate  


 


Blood Pressure  91/70


 


O2 Sat by Pulse 94 





Oximetry  














- Abdomen


soft, bowel sounds hypoactive (Appropriately TTP without rebound or guarding.  

ND)





- Labs





                                 06/23/21 05:26





                                 06/23/21 05:26


                                 Diabetes panel











  06/23/21 Range/Units





  05:26 


 


Sodium  148 H  (137-145)  mmol/L


 


Potassium  3.7  (3.6-5.0)  mmol/L


 


Chloride  112.7 H  ()  mmol/L


 


Carbon Dioxide  26  (22-30)  mmol/L


 


BUN  42 H  (9-20)  mg/dL


 


Creatinine  1.1  (0.8-1.3)  mg/dL


 


Glucose  83  ()  mg/dL


 


Calcium  8.1 L  (8.4-10.2)  mg/dL








                                  Calcium panel











  06/23/21 Range/Units





  05:26 


 


Calcium  8.1 L  (8.4-10.2)  mg/dL








                                 Pituitary panel











  06/23/21 Range/Units





  05:26 


 


Sodium  148 H  (137-145)  mmol/L


 


Potassium  3.7  (3.6-5.0)  mmol/L


 


Chloride  112.7 H  ()  mmol/L


 


Carbon Dioxide  26  (22-30)  mmol/L


 


BUN  42 H  (9-20)  mg/dL


 


Creatinine  1.1  (0.8-1.3)  mg/dL


 


Glucose  83  ()  mg/dL


 


Calcium  8.1 L  (8.4-10.2)  mg/dL








                                  Adrenal panel











  06/23/21 Range/Units





  05:26 


 


Sodium  148 H  (137-145)  mmol/L


 


Potassium  3.7  (3.6-5.0)  mmol/L


 


Chloride  112.7 H  ()  mmol/L


 


Carbon Dioxide  26  (22-30)  mmol/L


 


BUN  42 H  (9-20)  mg/dL


 


Creatinine  1.1  (0.8-1.3)  mg/dL


 


Glucose  83  ()  mg/dL


 


Calcium  8.1 L  (8.4-10.2)  mg/dL














- Imaging


Abdominal x-ray: report reviewed

## 2021-06-23 NOTE — CONSULTATION
History of Present Illness


Consult date: 06/23/21


Requesting physician: GHADA IRAHETA


Consult reason: atrial fibrillation


History of present illness: 





This patient is a 66-year-old male with a significant history of atrial 

fibrillation and AAA status post repair, SBO status post surgical repair several

days ago, and hypertension.  He is previously unknown to our practice and is not

currently followed by cardiology.  Patient presented to [facility] ER with a chief complaint of abdominal pain.  He was subsequently

diagnosed with small bowel obstruction and underwent surgical correction 

performed by Dr. Mateo Kramer.  Cardiology is consulted after patient was 

observed to be in atrial fib on telemetry.  At time of interview patient states 

he is asymptomatic.  He denies weakness, dizziness, chest pain, shortness of 

breath, N/V/D, recent illness or known exposures.  Patient has previously been 

diagnosed with atrial fibrillation for which he was previously treated with 

beta-blocker but patient did not follow-up and has been noncompliant with 

medications for several years.  He reports no known cardiac history but does 

have significant history of AAA s/p repair and history of DVT.





Past History


Past Medical History: hypertension, other (See HPI)


Past Surgical History: Other (Aortic artery aneurysm repair, penile implant)


Social history: other.  denies: smoking, alcohol abuse, prescription drug abuse


Family history: no significant family history





Medications and Allergies


                                    Allergies











Allergy/AdvReac Type Severity Reaction Status Date / Time


 


No Known Allergies Allergy   Verified 06/19/21 15:01











                                Home Medications











 Medication  Instructions  Recorded  Confirmed  Last Taken  Type


 


AtorvaSTATin 40 mg PO QHS 06/11/20 06/19/21 Unknown History


 


Metoprolol Succinate 50 mg PO DAILY 06/11/20 06/19/21 1 Day Ago History





    ~06/18/21 











Active Meds: 


Active Medications





Hydrophilic Ointment (Lip Therapy Vaseline)  1 applic TP AS DIRECT PRN


   PRN Reason: Dry Lips


   Last Admin: 06/22/21 15:03 Dose:  1 applic


   Documented by: 


Sodium Chloride (Nacl 0.9% 1000 Ml)  1,000 mls @ 150 mls/hr IV AS DIRECT LEO


   Last Admin: 06/23/21 07:06 Dose:  150 mls/hr


   Documented by: 


Morphine Sulfate (Morphine 2 Mg/1 Ml Inj)  2 mg IV Q3H PRN


   PRN Reason: Pain, Moderate (4-6)


   Last Admin: 06/22/21 17:11 Dose:  2 mg


   Documented by: 


Ondansetron HCl (Ondansetron 4 Mg/2 Ml Inj)  4 mg IV Q6H PRN


   PRN Reason: N/V unrelieved by Reglan


   Last Admin: 06/20/21 02:43 Dose:  4 mg


   Documented by: 











Review of Systems


Constitutional: no weight loss, no weight gain, no fever, no chills, no sweats, 

no night sweats


Ears, nose, mouth and throat: no ear pain, no ear discharge, no nose pain, no 

nasal congestion, no nasal discharge


Cardiovascular: palpitations, no chest pain, no orthopnea, no rapid/irregular 

heart beat, no edema, no syncope, no lightheadedness, no shortness of breath


Respiratory: no cough, no hemoptysis, no shortness of breath, no dyspnea on 

exertion


Gastrointestinal: abdominal pain, no nausea, no vomiting, no diarrhea


Musculoskeletal: no neck stiffness, no neck pain, no shooting arm pain, no arm 

numbness/tingling, no low back pain, no shooting leg pain


Integumentary: no rash, no pruritis, no redness, no sores, no wounds


Neurological: no head injury, no paralysis, no weakness, no parathesias, no 

numbness, no tingling, no seizures, no syncope


Psychiatric: no anxiety


Endocrine: no cold intolerance, no heat intolerance


Hematologic/Lymphatic: no easy bruising, no easy bleeding


Allergic/Immunologic: no urticaria





Physical Examination


                                        


                                Last Vital Signs











Temp  98.3 F   06/23/21 04:45


 


Pulse  131 H  06/23/21 04:45


 


Resp  18   06/23/21 04:45


 


BP  91/70   06/23/21 04:51


 


Pulse Ox  94   06/23/21 04:45














General appearance: no acute distress


HEENT: Positive: PERRL, Normocephaly, Mucus Membranes Moist


Neck: Positive: neck supple, trachea midline


Cardiac: Positive: irregularly irregular, S1/S2


Lungs: Positive: Normal Exam, Normal Breath Sounds


Neuro: Positive: Grossly Intact


Abdomen: Positive: Other (S/p abdominal surgery)


Skin: Negative: Rash, Wound


Musculoskeletal: No Pain


Extremities: Present: upper extr. pulses, lower extr. pulses.  Absent: edema





Results





                                 06/23/21 05:26





                                 06/23/21 05:26


                                       CBC











  06/23/21 Range/Units





  05:26 


 


WBC  10.0  (4.5-11.0)  K/mm3


 


RBC  4.73  (3.65-5.03)  M/mm3


 


Hgb  15.5 H  (11.8-15.2)  gm/dl


 


Hct  46.6 H  (35.5-45.6)  %


 


Plt Count  227  (140-440)  K/mm3


 


Lymph # (Auto)  1.2  (1.2-5.4)  K/mm3


 


Mono # (Auto)  1.5 H  (0.0-0.8)  K/mm3


 


Eos # (Auto)  0.2  (0.0-0.4)  K/mm3


 


Baso # (Auto)  0.0  (0.0-0.1)  K/mm3








                          Comprehensive Metabolic Panel











  06/23/21 Range/Units





  05:26 


 


Sodium  148 H  (137-145)  mmol/L


 


Potassium  3.7  (3.6-5.0)  mmol/L


 


Chloride  112.7 H  ()  mmol/L


 


Carbon Dioxide  26  (22-30)  mmol/L


 


BUN  42 H  (9-20)  mg/dL


 


Creatinine  1.1  (0.8-1.3)  mg/dL


 


Glucose  83  ()  mg/dL


 


Calcium  8.1 L  (8.4-10.2)  mg/dL














- Imaging and Cardiology


Echo: pending


EKG: report reviewed, image reviewed





EKG interpretations





- Telemetry


EKG Rhythm: Atrial Fibrillation





- EKG


Sinus rhythms and dysrhythmias: sinus tachycardia





Assessment and Plan





A. fib with RVR


* Patient is currently chest pain-free without cardiac symptoms.  Will monitor 

  CE's.  Continue to monitor on telemetry


* Patient is okay to start anticoagulation but is strictly n.p.o. per surgeon 

  Dr. Mateo Kramer.  Initiate Lovenox therapeutic dose 1 mg/kg SQ twice daily


* No rate control at this time due to hypotension.  Once hemodynamically stable 

  will initiate rate control medication.


* Echocardiogram is pending





Small bowel obstruction


* S/p surgical correction.  Strict n.p.o.





DVT prophylaxis


* Lovenox SQ





We will follow





This patient was seen in conjunction with Dr FAM Murray who agrees with assessment 

and plan of care





- Patient Problems


(1) Atrial fibrillation with RVR


Current Visit: Yes   Status: Chronic   





(2) SBO (small bowel obstruction)


Current Visit: Yes   Status: Acute   


Plan to address problem: 


S/p abdominal surgery correction








(3) S/P AAA (abdominal aortic aneurysm) repair


Current Visit: Yes   Status: Chronic   





(4) DVT prophylaxis


Current Visit: Yes   Status: Acute   





(5) HTN (hypertension)


Current Visit: Yes   Status: Chronic   


Qualifiers: 


   Hypertension type: essential hypertension   Qualified Code(s): I10 - 

Essential (primary) hypertension

## 2021-06-23 NOTE — PROGRESS NOTE
Assessment and Plan








Assessment:


High Grade Small Bowel Obstruction


ILEANA possibly 2/2 prerenal etiology in setting of vomiting and poor oral intake, 

hypotension, no obstruction


Hypotension


Hypokalemia


Hx of AAA repair








Plan:


- Renal function reviewed, SCr level was 1.1 today, yesterday's SCr level was 

1.4, has good UOP of 1050 ml


- Pt had elevated SCr level of 2.3 on 6/11/20, but normalized on 6/14/20 to 0.9


- On 0.9% NS infusion at 150 ml/hr


- UA showed elevated rbc, no significant proteinuria, no urine eosinophils


- No hydronephrosis noted on CT AP w/o contrast study on 6/19/21


- Renally dose medications


- Avoid nephrotoxic agents


- Constantino Catheter: No


- Renal plan reviewed by Dr. Parker








Subjective


Date of service: 06/23/21


Principal diagnosis: ARF


Interval history: 





Patient seen lying in bed. Reviewed renal labs.





Objective





- Vital Signs


Vital signs: 


                               Vital Signs - 12hr











  06/23/21 06/23/21





  04:45 04:51


 


Temperature 98.3 F 


 


Pulse Rate 131 H 


 


Respiratory 18 





Rate  


 


Blood Pressure  91/70


 


O2 Sat by Pulse 94 





Oximetry  














- General Appearance


General appearance: fatigue


EENT: ATNC, PERRL, hearing intact, vision intact


Neck: no JVD, supple


Respiratory: Present: Decreased Breath Sounds


Cardiology: S1S2


Gastrointestinal: other (Has NG tube in place)


Integumentary: warm and dry


Neurologic: alert and oriented x3


Musculoskeletal: other (No edema)





- Lab





                                 06/23/21 05:26





                                 06/23/21 05:26


                             Most recent lab results











Calcium  8.1 mg/dL (8.4-10.2)  L  06/23/21  05:26    


 


Phosphorus  5.40 mg/dL (2.5-4.5)  H  06/20/21  05:24    


 


Magnesium  2.90 mg/dL (1.7-2.3)  H  06/20/21  05:24    


 


Urine Creatinine  198.5 mg/dL (0.1-20.0)  H  06/19/21  Unknown


 


Urine Sodium  11 mmol/L  06/19/21  Unknown


 


Urine Total Protein  38 mg/dL (5-11.8)  H  06/19/21  Unknown














Medications & Allergies





- Medications


Allergies/Adverse Reactions: 


                                    Allergies





No Known Allergies Allergy (Verified 06/19/21 15:01)


   








Home Medications: 


                                Home Medications











 Medication  Instructions  Recorded  Confirmed  Last Taken  Type


 


AtorvaSTATin 40 mg PO QHS 06/11/20 06/19/21 Unknown History


 


Metoprolol Succinate 50 mg PO DAILY 06/11/20 06/19/21 1 Day Ago History





    ~06/18/21 











Active Medications: 














Generic Name Dose Route Start Last Admin





  Trade Name Freq  PRN Reason Stop Dose Admin


 


Hydrophilic Ointment  1 applic  06/22/21 09:43  06/22/21 15:03





  Lip Therapy Vaseline  TP   1 applic





  AS DIRECT PRN   Administration





  Dry Lips  


 


Sodium Chloride  1,000 mls @ 150 mls/hr  06/19/21 11:30  06/23/21 07:06





  Nacl 0.9% 1000 Ml  IV   150 mls/hr





  AS DIRECT LEO   Administration


 


Morphine Sulfate  2 mg  06/19/21 11:30  06/22/21 17:11





  Morphine 2 Mg/1 Ml Inj  IV   2 mg





  Q3H PRN   Administration





  Pain, Moderate (4-6)  


 


Ondansetron HCl  4 mg  06/19/21 11:29  06/20/21 02:43





  Ondansetron 4 Mg/2 Ml Inj  IV   4 mg





  Q6H PRN   Administration





  N/V unrelieved by Reglan

## 2021-06-23 NOTE — XRAY REPORT
ABDOMEN 3 VIEW(S)



INDICATION / CLINICAL INFORMATION: sbo.



COMPARISON: 6/21/2021



FINDINGS:



TUBES / LINES: Nasogastric tube tip and side-port remains in the stomach.

BOWEL GAS PATTERN: There is improved dilation of the small bowel. Small bowel remains dilated in the 
central abdomen. Interval midline incisional changes.

FREE AIR / EXTRALUMINAL GAS: Expected postoperative gas without large volume pneumoperitoneum.



ADDITIONAL FINDINGS: No significant additional findings.



CHEST: Visualized chest shows no significant abnormality.



IMPRESSION:

1.  Improved small bowel dilatation with persistent dilated small bowel central abdomen, may reflect 
residual postsurgical ileus.

2.  Nasogastric tube projects in the stomach.



Signer Name: Alvin Carvajal MD 

Signed: 6/23/2021 9:49 AM

Workstation Name: [email or web address]

## 2021-06-23 NOTE — PROGRESS NOTE
Assessment and Plan


Assessment and plan: 


66-year-old -American male with past medical history significant for 

aortic aneurysm repair, hypertension, penile implant presented to the emergency 

department with complaints of abdominal pain that started 4 days ago.  Patient 

states the pain was sharp, 10 out of 10 intensity, with no radiation, no 

aggravating or alleviating factors identified, patient has associated persistent

nausea and vomiting.  She was not eating and drinking for the last 4 days.  

Patient did not have any bowel movement, could not pass gas.  Patient is to call

his vascular surgeon and told him to go to the emergency department, his 

vascular surgeon stated it is not a vascular problem.


   In the emergency department patient was hypotensive and was given bolus of IV

fluids and blood pressure was corrected.  CT abdomen and pelvis showed small 

bowel obstruction with transition point.  General surgery was consulted.  

Patient has elevated BUN and creatinine and nephrology was consulted.  Patient 

admitted to the floor for further evaluation and management.








High-grade small bowel obstruction


-N.p.o., IV fluids, pain control


-during my examination patient was given pain medication and general pain 

resolved


-General surgery consulted


-We will continue to monitor





Hypotension


-Likely due to poor oral intake, persistent nausea and vomiting


-Patient was given bolus of IV fluids and corrected


-We will continue with normal saline


-Held blood pressure medications





Acute renal failure


-Likely due to dehydration, patient is on IV fluids 


-Nephrology consulted





History of aortic aneurysm repair


-CT did not show acute abnormalities


-H&H is stable








DVT prophylaxis


-SCDs because patient may need surgery





CODE STATUS


-Full


Disposition; admit to medical floor.





Management plan was discussed with the patient and was in agreement with the 

plan of care.








2021


-Blood pressure was low this morning and was given a bolus of normal saline.  

Continue with normal saline at 150 mL/h


-Slight improvement in creatinine level, patient is hypokalemic and was given 

potassium


-Nephrology consult appreciated


-Patient was seen by general surgery and recommend NG tube decompression





2021


-ILEANA is improving, continue with IV fluids.  Nephrology consult appreciated.


-Abdominal x-ray this morning showed worsening of small bowel obstruction, 

patient was evaluated by general surgery and considering to do laparotomy and 

lysis of adhesion, will follow with the surgeon


-Chest x-ray showed 1.7 cm mass on the right perihilar area, I ordered a CT 

chest without contrast


-Urine culture is negative and I stop IV antibiotic








: s/p EX LAP, Continue supportive care, Awaiting surgical re-evaluation, 

Renal function showing some improvement. Continue to monitor. 





: Noted tachycardia and hypotensive. Discussed with nursing staff, will give

a bolus of fluids and obtain EKG. Patient is on metoprolol outpatient, will c

ontinue to hold. Renal function improving. 





History


Interval history: 


Patient was seen and evaluated this morning, No bowel movement, on NG tube 

decompression but was not to suction


Patient admitted for high-grade small bowel obstruction and ILEANA








Hospitalist Physical





- Physical exam


Narrative exam: 


 Not in cardiopulmonary distress. 


 The patient appeared well nourished and normally developed. NGT inplace


 Vital signs as documented.


 Head exam is unremarkable.


 No scleral icterus .


 Neck is without jugular venous distension, thyromegaly, or carotid bruits. 


 Lungs are clear to auscultation.


Cardiac exam reveals regular rate and  Rhythm. 


Abdominal exam reveals normal bowel sounds, nontender, no organomegaly.


Extremities are nonedematous and both femoral and pedal pulses are normal.


CNS: Alert and oriented 3.  No focal weakness.

















- Constitutional


Vitals: 


                                        











Temp Pulse Resp BP Pulse Ox


 


 98.3 F   131 H  18   91/70   94 


 


 21 04:45  21 04:45  21 04:45  21 04:51  21 04:45














HEART Score





- HEART Score


Troponin: 


                                        











Troponin T  0.012 ng/mL (0.00-0.029)   21  03:00    














Results





- Labs


CBC & Chem 7: 


                                 21 05:26





                                 21 05:26


Labs: 


                             Laboratory Last Values











WBC  10.0 K/mm3 (4.5-11.0)   21  05:26    


 


RBC  4.73 M/mm3 (3.65-5.03)   21  05:26    


 


Hgb  15.5 gm/dl (11.8-15.2)  H  21  05:26    


 


Hct  46.6 % (35.5-45.6)  H  21  05:26    


 


MCV  99 fl (84-94)  H  21  05:26    


 


MCH  33 pg (28-32)  H  21  05:26    


 


MCHC  33 % (32-34)   21  05:26    


 


RDW  12.9 % (13.2-15.2)  L  21  05:26    


 


Plt Count  227 K/mm3 (140-440)   21  05:26    


 


Lymph % (Auto)  12.1 % (13.4-35.0)  L  21  05:26    


 


Mono % (Auto)  14.8 % (0.0-7.3)  H  21  05:26    


 


Eos % (Auto)  1.9 % (0.0-4.3)   21  05:26    


 


Baso % (Auto)  0.3 % (0.0-1.8)   21  05:26    


 


Lymph # (Auto)  1.2 K/mm3 (1.2-5.4)   21  05:26    


 


Mono # (Auto)  1.5 K/mm3 (0.0-0.8)  H  21  05:26    


 


Eos # (Auto)  0.2 K/mm3 (0.0-0.4)   21  05:26    


 


Baso # (Auto)  0.0 K/mm3 (0.0-0.1)   21  05:26    


 


Add Manual Diff  Complete   21  08:21    


 


Total Counted  100   21  08:21    


 


Seg Neutrophils %  70.9 % (40.0-70.0)  H  21  05:26    


 


Seg Neuts % (Manual)  81.0 % (40.0-70.0)  H  21  08:21    


 


Lymphocytes % (Manual)  6.0 % (13.4-35.0)  L  21  08:21    


 


Monocytes % (Manual)  12.0 % (0.0-7.3)  H  21  08:21    


 


Eosinophils % (Manual)  1.0 % (0.0-4.3)   21  08:21    


 


Nucleated RBC %  Not Reportable   21  08:21    


 


Seg Neutrophils #  7.1 K/mm3 (1.8-7.7)   21  05:26    


 


Seg Neutrophils # Man  7.0 K/mm3 (1.8-7.7)   21  08:21    


 


Band Neutrophils #  0.0 K/mm3  21  08:21    


 


Lymphocytes # (Manual)  0.5 K/mm3 (1.2-5.4)  L  21  08:21    


 


Abs React Lymphs (Man)  0.0 K/mm3  21  08:21    


 


Monocytes # (Manual)  1.0 K/mm3 (0.0-0.8)  H  21  08:21    


 


Eosinophils # (Manual)  0.1 K/mm3 (0.0-0.4)   21  08:21    


 


Basophils # (Manual)  0.0 K/mm3 (0.0-0.1)   21  08:21    


 


Metamyelocytes #  0.0 K/mm3  21  08:21    


 


Myelocytes #  0.0 K/mm3  21  08:21    


 


Promyelocytes #  0.0 K/mm3  21  08:21    


 


Blast Cells #  0.0 K/mm3  21  08:21    


 


WBC Morphology  Not Reportable   21  08:21    


 


WBC Morphology  TNR   21  08:21    


 


Hypersegmented Neuts  Not Reportable   21  08:21    


 


Hyposegmented Neuts  Not Reportable   21  08:21    


 


Hypogranular Neuts  Not Reportable   21  08:21    


 


Smudge Cells  Not Reportable   21  08:21    


 


Toxic Granulation  Not Reportable   21  08:21    


 


Toxic Vacuolation  Not Reportable   21  08:21    


 


Dohle Bodies  Not Reportable   21  08:21    


 


Pelger-Huet Anomaly  Not Reportable   21  08:21    


 


Pepito Rods  Not Reportable   21  08:21    


 


Platelet Estimate  Consistent w auto   21  08:21    


 


Clumped Platelets  Not Reportable   21  08:21    


 


Plt Clumps, EDTA  Not Reportable   21  08:21    


 


Large Platelets  Not Reportable   21  08:21    


 


Giant Platelets  Not Reportable   21  08:21    


 


Platelet Satelliting  Not Reportable   21  08:21    


 


Plt Morphology Comment  Not Reportable   21  08:21    


 


RBC Morphology  Not Reportable   21  08:21    


 


Dimorphic RBCs  Not Reportable   21  08:21    


 


Polychromasia  Not Reportable   21  08:21    


 


Hypochromasia  Not Reportable   21  08:21    


 


Poikilocytosis  Not Reportable   21  08:21    


 


Anisocytosis  Not Reportable   21  08:21    


 


Microcytosis  Not Reportable   21  08:21    


 


Macrocytosis  Not Reportable   21  08:21    


 


Spherocytes  Not Reportable   21  08:21    


 


Pappenheimer Bodies  Not Reportable   21  08:21    


 


Sickle Cells  Not Reportable   21  08:21    


 


Target Cells  Not Reportable   21  08:21    


 


Tear Drop Cells  Not Reportable   21  08:21    


 


Ovalocytes  Not Reportable   21  08:21    


 


Helmet Cells  Not Reportable   21  08:21    


 


Tse-Derwood Bodies  Not Reportable   21  08:21    


 


Cabot Rings  Not Reportable   21  08:21    


 


Primitivo Cells  Few   21  08:21    


 


Bite Cells  Not Reportable   21  08:21    


 


Crenated Cell  Not Reportable   21  08:21    


 


Elliptocytes  Few   21  08:21    


 


Acanthocytes (Spur)  Not Reportable   21  08:21    


 


Rouleaux  Not Reportable   21  08:21    


 


Hemoglobin C Crystals  Not Reportable   21  08:21    


 


Schistocytes  Rare   21  08:21    


 


Malaria parasites  Not Reportable   21  08:21    


 


Linus Bodies  Not Reportable   21  08:21    


 


Hem Pathologist Commnt  No   21  08:21    


 


Sodium  148 mmol/L (137-145)  H  21  05:26    


 


Potassium  3.7 mmol/L (3.6-5.0)   21  05:26    


 


Chloride  112.7 mmol/L ()  H  21  05:26    


 


Carbon Dioxide  26 mmol/L (22-30)   21  05:26    


 


Anion Gap  13 mmol/L  21  05:26    


 


BUN  42 mg/dL (9-20)  H  21  05:26    


 


Creatinine  1.1 mg/dL (0.8-1.3)   21  05:26    


 


Estimated GFR  > 60 ml/min  21  05:26    


 


BUN/Creatinine Ratio  38 %  21  05:26    


 


Glucose  83 mg/dL ()   21  05:26    


 


Calcium  8.1 mg/dL (8.4-10.2)  L  21  05:26    


 


Phosphorus  5.40 mg/dL (2.5-4.5)  H  21  05:24    


 


Magnesium  2.90 mg/dL (1.7-2.3)  H  21  05:24    


 


Total Bilirubin  0.80 mg/dL (0.1-1.2)   21  08:21    


 


Direct Bilirubin  0.2 mg/dL (0-0.2)   21  08:21    


 


Indirect Bilirubin  0.6 mg/dL  21  08:21    


 


AST  16 units/L (5-40)   21  08:21    


 


ALT  9 units/L (7-56)   21  08:21    


 


Alkaline Phosphatase  72 units/L ()   21  08:21    


 


Troponin T  0.012 ng/mL (0.00-0.029)   21  03:00    


 


Total Protein  8.0 g/dL (6.3-8.2)   21  08:21    


 


Albumin  4.3 g/dL (3.9-5)   21  08:21    


 


Albumin/Globulin Ratio  1.2 %  21  08:21    


 


Lipase  18 units/L (13-60)   21  08:21    


 


Urine Color  Ema  (Yellow)   21  09:23    


 


Urine Turbidity  Cloudy  (Clear)   21  09:23    


 


Urine pH  5.0  (5.0-7.0)   21  09:23    


 


Ur Specific Gravity  1.017  (1.003-1.030)   21  09:23    


 


Urine Protein  100 mg/dl mg/dL (Negative)   21  09:23    


 


Urine Glucose (UA)  Neg mg/dL (Negative)   21  09:23    


 


Urine Ketones  Neg mg/dL (Negative)   21  09:23    


 


Urine Blood  Mod  (Negative)   21  09:23    


 


Urine Nitrite  Neg  (Negative)   21  09:23    


 


Urine Bilirubin  Neg  (Negative)   21  09:23    


 


Urine Urobilinogen  2.0 mg/dL (<2.0)   21  09:23    


 


Ur Leukocyte Esterase  Neg  (Negative)   21  09:23    


 


Urine WBC (Auto)  9.0 /HPF (0.0-6.0)  H  21  09:23    


 


Urine RBC (Auto)  10.0 /HPF (0.0-6.0)   21  09:23    


 


U Epithel Cells (Auto)  2.0 /HPF (0-13.0)   21  09:23    


 


Hyaline Casts  23 /LPF  21  09:23    


 


Urine Mucus  2+ /HPF  21  09:23    


 


Urine Eosinophils  None seen  (None Seen)   21  Unknown


 


Urine Creatinine  198.5 mg/dL (0.1-20.0)  H  21  Unknown


 


Urine Microalbumin  2.6 mg/dL (0.1-34.0)   21  Unknown


 


Microalb/Creat Ratio  13.0 ug/mg  21  Unknown


 


Protein/Creatinin Ratio  0.19   21  Unknown


 


Urine Sodium  11 mmol/L  21  Unknown


 


Urine Total Protein  38 mg/dL (5-11.8)  H  21  Unknown


 


Blood Type  A POSITIVE   21  12:15    


 


Antibody Screen  Negative   21  12:15    











Constantino/IV: 


                                        





Voiding Method                   Indwelling Catheter











Active Medications





- Current Medications


Current Medications: 














Generic Name Dose Route Start Last Admin





  Trade Name Freq  PRN Reason Stop Dose Admin


 


Hydrophilic Ointment  1 applic  21 09:43  21 15:03





  Lip Therapy Vaseline  TP   1 applic





  AS DIRECT PRN   Administration





  Dry Lips  


 


Sodium Chloride  1,000 mls @ 150 mls/hr  21 11:30  21 07:06





  Nacl 0.9% 1000 Ml  IV   150 mls/hr





  AS DIRECT LEO   Administration


 


Morphine Sulfate  2 mg  21 11:30  21 17:11





  Morphine 2 Mg/1 Ml Inj  IV   2 mg





  Q3H PRN   Administration





  Pain, Moderate (4-6)  


 


Ondansetron HCl  4 mg  21 11:29  21 02:43





  Ondansetron 4 Mg/2 Ml Inj  IV   4 mg





  Q6H PRN   Administration





  N/V unrelieved by Bubba  














Nutrition/Malnutrition Assess





- Dietary Evaluation


Nutrition/Malnutrition Findings: 


                                        





Nutrition Notes                                            Start:  21 

11:22


Freq:                                                      Status: Active       




Protocol:                                                                       




 Document     21 11:22  NHALL  (Rec: 21 11:27  NHALL  [id number])


 Nutrition Notes


     Need for Assessment generated from:         RN Referral


     Initial or Follow up                        Assessment


     Current Diagnosis                           Acute Kidney Injury,


                                                 Hypertension,Small Bowel


                                                 Obstruction


     Current Diet                                NPO


     Labs/Tests                                  K 3.2


                                                 BUN 90


                                                 Cr 2.5


     Pertinent Medications                       10mEq KCl at 100ml/hr x 4 bags


                                                 , NS at 150ml/hr


     Height                                      5 ft 11 in


     Weight                                      74 kg


     Ideal Body Weight (kg)                      78.18


     BMI                                         22.7


     Weight Status                               Appropriate


     Subjective/Other Information                Pt screened for new onsest of


                                                 DM (not indicated in PMHx and


                                                 no medications ordered; BG


                                                 labs WNL) and skin risk (


                                                 Lars score: 17).  Pt


                                                 admitted with SBO; NGT to


                                                 suction at this time.


     Burn                                        Absent


     Trauma                                      Absent


     Minimum of two criteria                     No


     #1


      Nutrition Diagnosis                        Altered GI function


      Etiology                                   SBO


      As Evidenced by Signs and Symptoms         pt NPO and unable to tolerate


                                                 PO intake x 4 days PTA


     Is patient on ventilator?                   No


     Is Patient Ambulatory and/or Out of Bed     Yes


     REE-([facility]-ambulatory/OOB) [     [id number]


      [email or web address]]                               


     Calculation Used for Recommendations        [facility]


     Additional Notes                            Pro needs 0.8-1.2g/k-89g/


                                                 day


                                                 Fluid needs 1ml/kcal


 Nutrition Intervention


     Change Diet Order:                          Diet advancement when


                                                 medically feasible


     Goal #1                                     Advance diet to meet nutrient


                                                 needs


     Anticipated Discharge Needs:                Unable to identify at this


                                                 time


     Follow-Up By:                               21


     Additional Comments                         F/U: diet advancement, POC

## 2021-06-24 LAB
BASOPHILS # (AUTO): 0 K/MM3 (ref 0–0.1)
BASOPHILS NFR BLD AUTO: 0.2 % (ref 0–1.8)
BUN SERPL-MCNC: 28 MG/DL (ref 9–20)
BUN/CREAT SERPL: 25 %
CALCIUM SERPL-MCNC: 7.9 MG/DL (ref 8.4–10.2)
EOSINOPHIL # BLD AUTO: 0.2 K/MM3 (ref 0–0.4)
EOSINOPHIL NFR BLD AUTO: 2.5 % (ref 0–4.3)
HCT VFR BLD CALC: 44.7 % (ref 35.5–45.6)
HEMOLYSIS INDEX: 2
HGB BLD-MCNC: 15.3 GM/DL (ref 11.8–15.2)
LYMPHOCYTES # BLD AUTO: 1.1 K/MM3 (ref 1.2–5.4)
LYMPHOCYTES NFR BLD AUTO: 11.1 % (ref 13.4–35)
MCHC RBC AUTO-ENTMCNC: 34 % (ref 32–34)
MCV RBC AUTO: 98 FL (ref 84–94)
MONOCYTES # (AUTO): 1.4 K/MM3 (ref 0–0.8)
MONOCYTES % (AUTO): 14 % (ref 0–7.3)
PLATELET # BLD: 224 K/MM3 (ref 140–440)
RBC # BLD AUTO: 4.54 M/MM3 (ref 3.65–5.03)

## 2021-06-24 RX ADMIN — METOPROLOL TARTRATE SCH MG: 5 INJECTION INTRAVENOUS at 14:17

## 2021-06-24 RX ADMIN — DEXTROSE AND SODIUM CHLORIDE SCH MLS/HR: 5; .45 INJECTION, SOLUTION INTRAVENOUS at 06:25

## 2021-06-24 RX ADMIN — ENOXAPARIN SODIUM SCH MG: 100 INJECTION SUBCUTANEOUS at 21:06

## 2021-06-24 RX ADMIN — ENOXAPARIN SODIUM SCH MG: 100 INJECTION SUBCUTANEOUS at 09:41

## 2021-06-24 RX ADMIN — Medication PRN APPLIC: at 17:44

## 2021-06-24 RX ADMIN — METOPROLOL TARTRATE SCH MG: 5 INJECTION INTRAVENOUS at 21:06

## 2021-06-24 NOTE — PROGRESS NOTE
Assessment and Plan





- Patient Problems


(1) SBO (small bowel obstruction)


Current Visit: Yes   Status: Acute   


Plan to address problem: 


1) Advance NG 10 cm


 2) AXR, CBC and BMP in the am.








Subjective


Date of service: 06/24/21


Patient Reports: Positive: no new complaints, feels better, pain is less, bowel 

movement





Objective


                               Vital Signs - 12hr











  06/24/21 06/24/21 06/24/21





  11:54 14:17 15:36


 


Temperature 97.9 F  98.0 F


 


Pulse Rate 101 H 101 H 94 H


 


Respiratory 24  20





Rate   


 


Blood Pressure 158/104 158/104 149/113


 


O2 Sat by Pulse 94  95





Oximetry   














  06/24/21





  19:41


 


Temperature 


 


Pulse Rate 


 


Respiratory 18





Rate 


 


Blood Pressure 


 


O2 Sat by Pulse 





Oximetry 














- Abdomen


soft, bowel sounds hypoactive (Essentially NT.  Incision is c/d/i.  ND.)





- Labs





                                 06/24/21 07:07





                                 06/24/21 07:07


                                 Diabetes panel











  06/24/21 Range/Units





  07:07 


 


Sodium  152 H  (137-145)  mmol/L


 


Potassium  3.8  (3.6-5.0)  mmol/L


 


Chloride  115.8 H  ()  mmol/L


 


Carbon Dioxide  25  (22-30)  mmol/L


 


BUN  28 H  (9-20)  mg/dL


 


Creatinine  1.1  (0.8-1.3)  mg/dL


 


Glucose  87  ()  mg/dL


 


Calcium  7.9 L  (8.4-10.2)  mg/dL








                                  Calcium panel











  06/24/21 Range/Units





  07:07 


 


Calcium  7.9 L  (8.4-10.2)  mg/dL








                                 Pituitary panel











  06/24/21 Range/Units





  07:07 


 


Sodium  152 H  (137-145)  mmol/L


 


Potassium  3.8  (3.6-5.0)  mmol/L


 


Chloride  115.8 H  ()  mmol/L


 


Carbon Dioxide  25  (22-30)  mmol/L


 


BUN  28 H  (9-20)  mg/dL


 


Creatinine  1.1  (0.8-1.3)  mg/dL


 


Glucose  87  ()  mg/dL


 


Calcium  7.9 L  (8.4-10.2)  mg/dL








                                  Adrenal panel











  06/24/21 Range/Units





  07:07 


 


Sodium  152 H  (137-145)  mmol/L


 


Potassium  3.8  (3.6-5.0)  mmol/L


 


Chloride  115.8 H  ()  mmol/L


 


Carbon Dioxide  25  (22-30)  mmol/L


 


BUN  28 H  (9-20)  mg/dL


 


Creatinine  1.1  (0.8-1.3)  mg/dL


 


Glucose  87  ()  mg/dL


 


Calcium  7.9 L  (8.4-10.2)  mg/dL














- Imaging


Abdominal x-ray: report reviewed


Additional Studies: 





NG output 10 ml

## 2021-06-24 NOTE — PROGRESS NOTE
Assessment and Plan


Assessment and plan: 


66-year-old -American male with past medical history significant for 

aortic aneurysm repair, hypertension, penile implant presented to the emergency 

department with complaints of abdominal pain that started 4 days ago.  Patient 

states the pain was sharp, 10 out of 10 intensity, with no radiation, no 

aggravating or alleviating factors identified, patient has associated persistent

nausea and vomiting.  She was not eating and drinking for the last 4 days.  

Patient did not have any bowel movement, could not pass gas.  Patient is to call

his vascular surgeon and told him to go to the emergency department, his 

vascular surgeon stated it is not a vascular problem.


   In the emergency department patient was hypotensive and was given bolus of IV

fluids and blood pressure was corrected.  CT abdomen and pelvis showed small 

bowel obstruction with transition point.  General surgery was consulted.  

Patient has elevated BUN and creatinine and nephrology was consulted.  Patient 

admitted to the floor for further evaluation and management.








High-grade small bowel obstruction


-N.p.o., IV fluids, pain control


-during my examination patient was given pain medication and general pain 

resolved


-General surgery consulted


-We will continue to monitor





Hypotension


-Likely due to poor oral intake, persistent nausea and vomiting


-Patient was given bolus of IV fluids and corrected


-We will continue with normal saline


-Held blood pressure medications





Acute renal failure


-Likely due to dehydration, patient is on IV fluids 


-Nephrology consulted





History of aortic aneurysm repair


-CT did not show acute abnormalities


-H&H is stable








DVT prophylaxis


-SCDs because patient may need surgery





CODE STATUS


-Full


Disposition; admit to medical floor.





Management plan was discussed with the patient and was in agreement with the 

plan of care.








2021


-Blood pressure was low this morning and was given a bolus of normal saline.  

Continue with normal saline at 150 mL/h


-Slight improvement in creatinine level, patient is hypokalemic and was given 

potassium


-Nephrology consult appreciated


-Patient was seen by general surgery and recommend NG tube decompression





2021


-ILEANA is improving, continue with IV fluids.  Nephrology consult appreciated.


-Abdominal x-ray this morning showed worsening of small bowel obstruction, 

patient was evaluated by general surgery and considering to do laparotomy and 

lysis of adhesion, will follow with the surgeon


-Chest x-ray showed 1.7 cm mass on the right perihilar area, I ordered a CT 

chest without contrast


-Urine culture is negative and I stop IV antibiotic





: s/p EX LAP, Continue supportive care, Awaiting surgical re-evaluation, 

Renal function showing some improvement. Continue to monitor. 





: Noted tachycardia and hypotensive. Discussed with nursing staff, will give

a bolus of fluids and obtain EKG. Patient is on metoprolol outpatient, will co

ntinue to hold. Renal function improving. 





: Patient yesterday noted to have Atrial fibrillation with RVR. Started on 

IV metoprolol and Enoxaparin. Will transfer to Telemetry. He remains on strict 

NPO 





History


Interval history: 


Patient was seen and evaluated this morning, No bowel movement, But with flatus 

per the patient, on NG tube decompression but was not to suction


Patient admitted for high-grade small bowel obstruction and ILEANA








Hospitalist Physical





- Physical exam


Narrative exam: 


 Not in cardiopulmonary distress. 


 The patient appeared well nourished and normally developed. NGT inplace


 Vital signs as documented.


 Head exam is unremarkable.


 No scleral icterus .


 Neck is without jugular venous distension, thyromegaly, or carotid bruits. 


 Lungs are clear to auscultation.


Cardiac exam reveals regular rate and  Rhythm. 


Abdominal exam reveals normal bowel sounds, nontender, no organomegaly.


Extremities are nonedematous and both femoral and pedal pulses are normal.


CNS: Alert and oriented 3.  No focal weakness.

















- Constitutional


Vitals: 


                                        











Temp Pulse Resp BP Pulse Ox


 


 97.8 F   100 H  18   168/125   97 


 


 21 04:26  21 06:25  21 04:26  21 06:25  21 04:26











General appearance: Present: no acute distress





HEART Score





- HEART Score


Troponin: 


                                        











Troponin T  < 0.010 ng/mL (0.00-0.029)   21  07:07    














Results





- Labs


CBC & Chem 7: 


                                 21 07:07





                                 21 07:07


Labs: 


                             Laboratory Last Values











WBC  9.8 K/mm3 (4.5-11.0)   21  07:07    


 


RBC  4.54 M/mm3 (3.65-5.03)   21  07:07    


 


Hgb  15.3 gm/dl (11.8-15.2)  H  21  07:07    


 


Hct  44.7 % (35.5-45.6)   21  07:07    


 


MCV  98 fl (84-94)  H  21  07:07    


 


MCH  34 pg (28-32)  H  21  07:07    


 


MCHC  34 % (32-34)   21  07:07    


 


RDW  12.8 % (13.2-15.2)  L  21  07:07    


 


Plt Count  224 K/mm3 (140-440)   21  07:07    


 


Lymph % (Auto)  11.1 % (13.4-35.0)  L  21  07:07    


 


Mono % (Auto)  14.0 % (0.0-7.3)  H  21  07:07    


 


Eos % (Auto)  2.5 % (0.0-4.3)   21  07:07    


 


Baso % (Auto)  0.2 % (0.0-1.8)   21  07:07    


 


Lymph # (Auto)  1.1 K/mm3 (1.2-5.4)  L  21  07:07    


 


Mono # (Auto)  1.4 K/mm3 (0.0-0.8)  H  21  07:07    


 


Eos # (Auto)  0.2 K/mm3 (0.0-0.4)   21  07:07    


 


Baso # (Auto)  0.0 K/mm3 (0.0-0.1)   21  07:07    


 


Add Manual Diff  Complete   21  08:21    


 


Total Counted  100   21  08:21    


 


Seg Neutrophils %  72.2 % (40.0-70.0)  H  21  07:07    


 


Seg Neuts % (Manual)  81.0 % (40.0-70.0)  H  21  08:21    


 


Lymphocytes % (Manual)  6.0 % (13.4-35.0)  L  21  08:21    


 


Monocytes % (Manual)  12.0 % (0.0-7.3)  H  21  08:21    


 


Eosinophils % (Manual)  1.0 % (0.0-4.3)   21  08:21    


 


Nucleated RBC %  Not Reportable   21  08:21    


 


Seg Neutrophils #  7.1 K/mm3 (1.8-7.7)   21  07:07    


 


Seg Neutrophils # Man  7.0 K/mm3 (1.8-7.7)   21  08:21    


 


Band Neutrophils #  0.0 K/mm3  21  08:21    


 


Lymphocytes # (Manual)  0.5 K/mm3 (1.2-5.4)  L  21  08:21    


 


Abs React Lymphs (Man)  0.0 K/mm3  21  08:21    


 


Monocytes # (Manual)  1.0 K/mm3 (0.0-0.8)  H  21  08:21    


 


Eosinophils # (Manual)  0.1 K/mm3 (0.0-0.4)   21  08:21    


 


Basophils # (Manual)  0.0 K/mm3 (0.0-0.1)   21  08:21    


 


Metamyelocytes #  0.0 K/mm3  21  08:21    


 


Myelocytes #  0.0 K/mm3  21  08:21    


 


Promyelocytes #  0.0 K/mm3  21  08:21    


 


Blast Cells #  0.0 K/mm3  21  08:21    


 


WBC Morphology  Not Reportable   21  08:21    


 


WBC Morphology  TNR   21  08:21    


 


Hypersegmented Neuts  Not Reportable   21  08:21    


 


Hyposegmented Neuts  Not Reportable   21  08:21    


 


Hypogranular Neuts  Not Reportable   21  08:21    


 


Smudge Cells  Not Reportable   21  08:21    


 


Toxic Granulation  Not Reportable   21  08:21    


 


Toxic Vacuolation  Not Reportable   21  08:21    


 


Dohle Bodies  Not Reportable   21  08:21    


 


Pelger-Huet Anomaly  Not Reportable   21  08:21    


 


Pepito Rods  Not Reportable   21  08:21    


 


Platelet Estimate  Consistent w auto   21  08:21    


 


Clumped Platelets  Not Reportable   21  08:21    


 


Plt Clumps, EDTA  Not Reportable   21  08:21    


 


Large Platelets  Not Reportable   21  08:21    


 


Giant Platelets  Not Reportable   21  08:21    


 


Platelet Satelliting  Not Reportable   21  08:21    


 


Plt Morphology Comment  Not Reportable   21  08:21    


 


RBC Morphology  Not Reportable   21  08:21    


 


Dimorphic RBCs  Not Reportable   21  08:21    


 


Polychromasia  Not Reportable   21  08:21    


 


Hypochromasia  Not Reportable   21  08:21    


 


Poikilocytosis  Not Reportable   21  08:21    


 


Anisocytosis  Not Reportable   21  08:21    


 


Microcytosis  Not Reportable   21  08:21    


 


Macrocytosis  Not Reportable   21  08:21    


 


Spherocytes  Not Reportable   21  08:21    


 


Pappenheimer Bodies  Not Reportable   21  08:21    


 


Sickle Cells  Not Reportable   21  08:21    


 


Target Cells  Not Reportable   21  08:21    


 


Tear Drop Cells  Not Reportable   21  08:21    


 


Ovalocytes  Not Reportable   21  08:21    


 


Helmet Cells  Not Reportable   21  08:21    


 


Tse-Magness Bodies  Not Reportable   21  08:21    


 


Cabot Rings  Not Reportable   21  08:21    


 


Primitivo Cells  Few   21  08:21    


 


Bite Cells  Not Reportable   21  08:21    


 


Crenated Cell  Not Reportable   21  08:21    


 


Elliptocytes  Few   21  08:21    


 


Acanthocytes (Spur)  Not Reportable   21  08:21    


 


Rouleaux  Not Reportable   21  08:21    


 


Hemoglobin C Crystals  Not Reportable   21  08:21    


 


Schistocytes  Rare   21  08:21    


 


Malaria parasites  Not Reportable   21  08:21    


 


Linus Bodies  Not Reportable   21  08:21    


 


Hem Pathologist Commnt  No   21  08:21    


 


Sodium  152 mmol/L (137-145)  H  21  07:07    


 


Potassium  3.8 mmol/L (3.6-5.0)   21  07:07    


 


Chloride  115.8 mmol/L ()  H  21  07:07    


 


Carbon Dioxide  25 mmol/L (22-30)   21  07:07    


 


Anion Gap  15 mmol/L  21  07:07    


 


BUN  28 mg/dL (9-20)  H  21  07:07    


 


Creatinine  1.1 mg/dL (0.8-1.3)   21  07:07    


 


Estimated GFR  > 60 ml/min  21  07:07    


 


BUN/Creatinine Ratio  25 %  21  07:07    


 


Glucose  87 mg/dL ()   21  07:07    


 


Calcium  7.9 mg/dL (8.4-10.2)  L  21  07:07    


 


Phosphorus  5.40 mg/dL (2.5-4.5)  H  21  05:24    


 


Magnesium  2.30 mg/dL (1.7-2.3)   21  07:07    


 


Total Bilirubin  0.80 mg/dL (0.1-1.2)   21  08:21    


 


Direct Bilirubin  0.2 mg/dL (0-0.2)   21  08:21    


 


Indirect Bilirubin  0.6 mg/dL  21  08:21    


 


AST  16 units/L (5-40)   21  08:21    


 


ALT  9 units/L (7-56)   21  08:21    


 


Alkaline Phosphatase  72 units/L ()   21  08:21    


 


Troponin T  < 0.010 ng/mL (0.00-0.029)   21  07:07    


 


Total Protein  8.0 g/dL (6.3-8.2)   21  08:21    


 


Albumin  4.3 g/dL (3.9-5)   21  08:21    


 


Albumin/Globulin Ratio  1.2 %  21  08:21    


 


Lipase  18 units/L (13-60)   21  08:21    


 


Urine Color  Ema  (Yellow)   21  09:23    


 


Urine Turbidity  Cloudy  (Clear)   21  09:23    


 


Urine pH  5.0  (5.0-7.0)   21  09:23    


 


Ur Specific Gravity  1.017  (1.003-1.030)   21  09:23    


 


Urine Protein  100 mg/dl mg/dL (Negative)   21  09:23    


 


Urine Glucose (UA)  Neg mg/dL (Negative)   21  09:23    


 


Urine Ketones  Neg mg/dL (Negative)   21  09:23    


 


Urine Blood  Mod  (Negative)   21  09:23    


 


Urine Nitrite  Neg  (Negative)   21  09:23    


 


Urine Bilirubin  Neg  (Negative)   21  09:23    


 


Urine Urobilinogen  2.0 mg/dL (<2.0)   21  09:23    


 


Ur Leukocyte Esterase  Neg  (Negative)   21  09:23    


 


Urine WBC (Auto)  9.0 /HPF (0.0-6.0)  H  21  09:23    


 


Urine RBC (Auto)  10.0 /HPF (0.0-6.0)   21  09:23    


 


U Epithel Cells (Auto)  2.0 /HPF (0-13.0)   21  09:23    


 


Hyaline Casts  23 /LPF  21  09:23    


 


Urine Mucus  2+ /HPF  21  09:23    


 


Urine Eosinophils  None seen  (None Seen)   21  Unknown


 


Urine Creatinine  198.5 mg/dL (0.1-20.0)  H  21  Unknown


 


Urine Microalbumin  2.6 mg/dL (0.1-34.0)   21  Unknown


 


Microalb/Creat Ratio  13.0 ug/mg  21  Unknown


 


Protein/Creatinin Ratio  0.19   21  Unknown


 


Urine Sodium  11 mmol/L  21  Unknown


 


Urine Total Protein  38 mg/dL (5-11.8)  H  21  Unknown


 


Blood Type  A POSITIVE   21  12:15    


 


Antibody Screen  Negative   21  12:15    











Constantino/IV: 


                                        





Voiding Method                   Toilet











Active Medications





- Current Medications


Current Medications: 














Generic Name Dose Route Start Last Admin





  Trade Name Freq  PRN Reason Stop Dose Admin


 


Enoxaparin Sodium  70 mg  21 17:00  21 09:41





  Enoxaparin 80 Mg/0.8 Ml Inj  SUB-Q   70 mg





  Q12HR LEO   Administration


 


Hydrophilic Ointment  1 applic  21 09:43  21 15:03





  Lip Therapy Vaseline  TP   1 applic





  AS DIRECT PRN   Administration





  Dry Lips  


 


Dextrose/Sodium Chloride  1,000 mls @ 125 mls/hr  21 05:00  21 06:25





  D5/0.45ns  IV   75 mls/hr





  AS DIRECT LEO   Administration


 


Metoprolol Tartrate  5 mg  21 14:00 





  Metoprolol Tartrate 5 Mg/5 Ml Inj  IV  





  Q8H LEO  


 


Morphine Sulfate  2 mg  21 11:30  21 17:11





  Morphine 2 Mg/1 Ml Inj  IV   2 mg





  Q3H PRN   Administration





  Pain, Moderate (4-6)  


 


Ondansetron HCl  4 mg  21 11:29  21 02:43





  Ondansetron 4 Mg/2 Ml Inj  IV   4 mg





  Q6H PRN   Administration





  N/V unrelieved by Bubba  














Nutrition/Malnutrition Assess





- Dietary Evaluation


Nutrition/Malnutrition Findings: 


                                        





Nutrition Notes                                            Start:  21 

11:22


Freq:                                                      Status: Active       




Protocol:                                                                       




 Document     21 11:22  [facility]  (Rec: 21 11:27  [facility]  [id number])


 Nutrition Notes


     Need for Assessment generated from:         RN Referral


     Initial or Follow up                        Assessment


     Current Diagnosis                           Acute Kidney Injury,


                                                 Hypertension,Small Bowel


                                                 Obstruction


     Current Diet                                NPO


     Labs/Tests                                  K 3.2


                                                 BUN 90


                                                 Cr 2.5


     Pertinent Medications                       10mEq KCl at 100ml/hr x 4 bags


                                                 , NS at 150ml/hr


     Height                                      5 ft 11 in


     Weight                                      74 kg


     Ideal Body Weight (kg)                      78.18


     BMI                                         22.7


     Weight Status                               Appropriate


     Subjective/Other Information                Pt screened for new onsest of


                                                 DM (not indicated in PMHx and


                                                 no medications ordered; BG


                                                 labs WNL) and skin risk (


                                                 Lars score: 17).  Pt


                                                 admitted with SBO; NGT to


                                                 suction at this time.


     Burn                                        Absent


     Trauma                                      Absent


     Minimum of two criteria                     No


     #1


      Nutrition Diagnosis                        Altered GI function


      Etiology                                   SBO


      As Evidenced by Signs and Symptoms         pt NPO and unable to tolerate


                                                 PO intake x 4 days PTA


     Is patient on ventilator?                   No


     Is Patient Ambulatory and/or Out of Bed     Yes


     REE-([facility]-ambulatory/OOB) [     .269


      [id number]]                               


     Calculation Used for Recommendations        [facility]


     Additional Notes                            Pro needs 0.8-1.2g/k-89g/


                                                 day


                                                 Fluid needs 1ml/kcal


 Nutrition Intervention


     Change Diet Order:                          Diet advancement when


                                                 medically feasible


     Goal #1                                     Advance diet to meet nutrient


                                                 needs


     Anticipated Discharge Needs:                Unable to identify at this


                                                 time


     Follow-Up By:                               21


     Additional Comments                         F/U: diet advancement, POC

## 2021-06-24 NOTE — XRAY REPORT
ABDOMEN 3 VIEW(S)



INDICATION / CLINICAL INFORMATION:

sbo.



COMPARISON: 

Abdomen series from yesterday



FINDINGS:



TUBES / LINES: The NG tube has been retracted into the distal esophagus and should be advanced at ruth
st 10 cm.



BOWEL GAS PATTERN: Persistent mild diffuse gaseous distention of the bowel with full thickening in th
e left mid abdomen suggesting underlying inflammation. 



FREE AIR / EXTRALUMINAL GAS: None seen.



ADDITIONAL FINDINGS: No significant additional findings.







IMPRESSION:

1. Malpositioned NG tube as above.

2. Similar appearing bowel findings. Given diffuse gaseous distention, postoperative ileus is high in
 the differential. There is also fold thickening suggesting inflammation along the bowel in the left 
lateral mid abdomen region.



Signer Name: Nick Taylor MD 

Signed: 6/24/2021 10:09 AM

Workstation Name: [id number]

## 2021-06-24 NOTE — ELECTROCARDIOGRAPH REPORT
[facility]

                                       

Test Date:    2021               Test Time:    12:06:10

Pat Name:     NESSA MARAVILLA               Department:   

Patient ID:   [id number]          Room:         A387 1

Gender:       M                        Technician:   ANNAMARIA

:          1955               Requested By: GHADA IRAHETA

Order Number: [id number]              Reading MD:   Adán Mayo

                                 Measurements

Intervals                              Axis          

Rate:         99                       P:            68

AL:           184                      QRS:          70

QRSD:         93                       T:            42

QT:           371                                    

QTc:          476                                    

                           Interpretive Statements

Sinus rhythm

Low voltage, extremity leads

Compared to ECG 2021 01:06:07

Sinus rhythm has replaced atrial tachycardia

Electronically Signed On 2021 14:30:41 EDT by Adán Mayo

## 2021-06-24 NOTE — PROGRESS NOTE
Assessment and Plan


Initiate IV Lopressor 5mg q8h with hold parameters. May titrate up if needed for

rate control and/or BP optimization while pt remains NPO. 


Continue anticoagulation with SQ Lovenox. Will revisit OAC when pt is tolerating

PO meds; however, pt is very adamant about declining OAC at this time. 





Pt seen in conjunction with Dr. FAM Murray, who agrees with the assessment and plan

of care. 





- Patient Problems


(1) SBO (small bowel obstruction)


Current Visit: Yes   Status: Acute   





(2) Paroxysmal atrial fibrillation with RVR


Current Visit: Yes   Status: Chronic   





(3) HTN (hypertension)


Current Visit: Yes   Status: Chronic   


Qualifiers: 


   Hypertension type: essential hypertension   Qualified Code(s): I10 - 

Essential (primary) hypertension   





(4) S/P AAA (abdominal aortic aneurysm) repair


Current Visit: Yes   Status: Chronic   





Subjective


Date of service: 06/24/21


Principal diagnosis: SBO, PAF with RVR


Interval history: 


Upset that he cannot eat or drink. Otherwise doing ok. No cardiac complaints. 

Tele reviewed - SR 90s this AM with intermittent AF noted, no events overnight. 





Objective


Last Vital Signs











Temp  98.0 F   06/24/21 15:36


 


Pulse  94 H  06/24/21 15:36


 


Resp  20   06/24/21 15:36


 


BP  149/113   06/24/21 15:36


 


Pulse Ox  95   06/24/21 15:36











- Physical Examination


General: No Apparent Distress


HEENT: Positive: EOMI, Normocephaly, Mucus Membranes Moist


Neck: Positive: neck supple, trachea midline


Cardiac: Positive: Reg Rate and Rhythm, S1/S2


Lungs: Positive: clear to auscultation


Neuro: Positive: Grossly Intact


Abdomen: Positive: Other (NGT in situ)


Skin: Negative: Rash


Musculoskeletal: No Pain


Extremities: Present: upper extr. pulses, lower extr. pulses.  Absent: edema





- Labs and Meds


                                       CBC











  06/24/21 Range/Units





  07:07 


 


WBC  9.8  (4.5-11.0)  K/mm3


 


RBC  4.54  (3.65-5.03)  M/mm3


 


Hgb  15.3 H  (11.8-15.2)  gm/dl


 


Hct  44.7  (35.5-45.6)  %


 


Plt Count  224  (140-440)  K/mm3


 


Lymph # (Auto)  1.1 L  (1.2-5.4)  K/mm3


 


Mono # (Auto)  1.4 H  (0.0-0.8)  K/mm3


 


Eos # (Auto)  0.2  (0.0-0.4)  K/mm3


 


Baso # (Auto)  0.0  (0.0-0.1)  K/mm3








                          Comprehensive Metabolic Panel











  06/24/21 Range/Units





  07:07 


 


Sodium  152 H  (137-145)  mmol/L


 


Potassium  3.8  (3.6-5.0)  mmol/L


 


Chloride  115.8 H  ()  mmol/L


 


Carbon Dioxide  25  (22-30)  mmol/L


 


BUN  28 H  (9-20)  mg/dL


 


Creatinine  1.1  (0.8-1.3)  mg/dL


 


Glucose  87  ()  mg/dL


 


Calcium  7.9 L  (8.4-10.2)  mg/dL














- Imaging and Cardiology


EKG: report reviewed, image reviewed


Echo: report reviewed (6/23/2021 - EF 50-55%, mild pulm HTN, mild TR)





- Telemetry


EKG Rhythm: Sinus Rhythm





- EKG


Sinus rhythms and dysrhythmias: sinus rhythm

## 2021-06-24 NOTE — ELECTROCARDIOGRAPH REPORT
[facility]

                                       

Test Date:    2021               Test Time:    01:06:07

Pat Name:     NESSA MARAVILLA               Department:   

Patient ID:   [id number]          Room:         A387 1

Gender:       M                        Technician:   MARIA ANTONIA

:          1955               Requested By: JAN WITT

Order Number: [id number]              Reading MD:   Adán Mayo

                                 Measurements

Intervals                              Axis          

Rate:         124                      P:            0

TX:           80                       QRS:          55

QRSD:         95                       T:            25

QT:           327                                    

QTc:          470                                    

                           Interpretive Statements

Atrial tachycardia

No previous ECG available for comparison

Electronically Signed On 2021 14:04:37 EDT by Adán Mayo

## 2021-06-24 NOTE — PROGRESS NOTE
Assessment and Plan


High Grade Small Bowel Obstruction


ILEANA possibly 2/2 prerenal etiology in setting of vomiting and poor oral intake, 

hypotension, no obstruction


Hypotension


Hypokalemia


Hx of AAA repair








Plan:


- ILEANA resolved


- IVF switched to 1/2 NS for rising Na due to poor oral fluid intake


- No hydronephrosis noted on CT AP w/o contrast study on 6/19/21


- Renally dose medications


- Avoid nephrotoxic agents


- Constantino Catheter: No








Subjective


Date of service: 06/24/21


Principal diagnosis: ARF


Interval history: 





developed Afib with RVR last night, cont to be NPO





Objective





- Vital Signs


Vital signs: 


                               Vital Signs - 12hr











  06/23/21 06/24/21 06/24/21





  21:21 04:26 06:25


 


Temperature 98.5 F 97.8 F 


 


Pulse Rate 50 L 100 H 100 H


 


Respiratory 18 18 





Rate   


 


Blood Pressure 152/101 168/125 168/125


 


O2 Sat by Pulse 91 97 





Oximetry   














- Lab





                                 06/24/21 07:07





                                 06/24/21 07:07


                             Most recent lab results











Calcium  7.9 mg/dL (8.4-10.2)  L  06/24/21  07:07    


 


Phosphorus  5.40 mg/dL (2.5-4.5)  H  06/20/21  05:24    


 


Magnesium  2.30 mg/dL (1.7-2.3)   06/24/21  07:07    


 


Urine Creatinine  198.5 mg/dL (0.1-20.0)  H  06/19/21  Unknown


 


Urine Sodium  11 mmol/L  06/19/21  Unknown


 


Urine Total Protein  38 mg/dL (5-11.8)  H  06/19/21  Unknown














Medications & Allergies





- Medications


Allergies/Adverse Reactions: 


                                    Allergies





No Known Allergies Allergy (Verified 06/19/21 15:01)


   








Home Medications: 


                                Home Medications











 Medication  Instructions  Recorded  Confirmed  Last Taken  Type


 


AtorvaSTATin 40 mg PO QHS 06/11/20 06/19/21 Unknown History


 


Metoprolol Succinate 50 mg PO DAILY 06/11/20 06/19/21 1 Day Ago History





    ~06/18/21 











Active Medications: 














Generic Name Dose Route Start Last Admin





  Trade Name Freq  PRN Reason Stop Dose Admin


 


Enoxaparin Sodium  70 mg  06/23/21 17:00  06/23/21 23:36





  Enoxaparin 80 Mg/0.8 Ml Inj  SUB-Q   Not Given





  Q12HR LEO  


 


Hydrophilic Ointment  1 applic  06/22/21 09:43  06/22/21 15:03





  Lip Therapy Vaseline  TP   1 applic





  AS DIRECT PRN   Administration





  Dry Lips  


 


Dextrose/Sodium Chloride  1,000 mls @ 125 mls/hr  06/24/21 05:00  06/24/21 06:25





  D5/0.45ns  IV   75 mls/hr





  AS DIRECT LEO   Administration


 


Metoprolol Tartrate  5 mg  06/24/21 09:15 





  Metoprolol Tartrate 5 Mg/5 Ml Inj  IV  





  Q8H LEO  


 


Morphine Sulfate  2 mg  06/19/21 11:30  06/22/21 17:11





  Morphine 2 Mg/1 Ml Inj  IV   2 mg





  Q3H PRN   Administration





  Pain, Moderate (4-6)  


 


Ondansetron HCl  4 mg  06/19/21 11:29  06/20/21 02:43





  Ondansetron 4 Mg/2 Ml Inj  IV   4 mg





  Q6H PRN   Administration





  N/V unrelieved by Reglan

## 2021-06-25 LAB
BASOPHILS # (AUTO): 0 K/MM3 (ref 0–0.1)
BASOPHILS NFR BLD AUTO: 0.3 % (ref 0–1.8)
BUN SERPL-MCNC: 19 MG/DL (ref 9–20)
BUN/CREAT SERPL: 21 %
CALCIUM SERPL-MCNC: 8.1 MG/DL (ref 8.4–10.2)
EOSINOPHIL # BLD AUTO: 0.3 K/MM3 (ref 0–0.4)
EOSINOPHIL NFR BLD AUTO: 2.9 % (ref 0–4.3)
HCT VFR BLD CALC: 49 % (ref 35.5–45.6)
HEMOLYSIS INDEX: 75
HGB BLD-MCNC: 16.6 GM/DL (ref 11.8–15.2)
LYMPHOCYTES # BLD AUTO: 1.2 K/MM3 (ref 1.2–5.4)
LYMPHOCYTES NFR BLD AUTO: 11.2 % (ref 13.4–35)
MCHC RBC AUTO-ENTMCNC: 34 % (ref 32–34)
MCV RBC AUTO: 98 FL (ref 84–94)
MONOCYTES # (AUTO): 1.2 K/MM3 (ref 0–0.8)
MONOCYTES % (AUTO): 10.8 % (ref 0–7.3)
PLATELET # BLD: 231 K/MM3 (ref 140–440)
RBC # BLD AUTO: 5.03 M/MM3 (ref 3.65–5.03)

## 2021-06-25 RX ADMIN — ENOXAPARIN SODIUM SCH MG: 100 INJECTION SUBCUTANEOUS at 11:37

## 2021-06-25 RX ADMIN — METOPROLOL TARTRATE SCH MG: 5 INJECTION INTRAVENOUS at 21:37

## 2021-06-25 RX ADMIN — DEXTROSE AND SODIUM CHLORIDE SCH MLS/HR: 5; .45 INJECTION, SOLUTION INTRAVENOUS at 13:26

## 2021-06-25 RX ADMIN — ENOXAPARIN SODIUM SCH MG: 100 INJECTION SUBCUTANEOUS at 21:38

## 2021-06-25 RX ADMIN — METOPROLOL TARTRATE SCH MG: 5 INJECTION INTRAVENOUS at 13:21

## 2021-06-25 RX ADMIN — METOPROLOL TARTRATE SCH MG: 5 INJECTION INTRAVENOUS at 05:05

## 2021-06-25 RX ADMIN — MAGNESIUM HYDROXIDE SCH ML: 400 SUSPENSION ORAL at 13:21

## 2021-06-25 RX ADMIN — DEXTROSE AND SODIUM CHLORIDE SCH MLS/HR: 5; .45 INJECTION, SOLUTION INTRAVENOUS at 00:53

## 2021-06-25 NOTE — PROGRESS NOTE
Assessment and Plan





- Patient Problems


(1) SBO (small bowel obstruction)


Current Visit: Yes   Status: Acute   


Plan to address problem: 


1) With minimal NG output, +flatus, +BM & a benign abdominal exam, I believe it 

is reasonable to dc NG and begin CLD.  Will add MOM, 30 ml po daily.


 2) AXR, CBC and BMP in the am








Subjective


Date of service: 06/25/21


Patient Reports: Positive: no new complaints, feels better, flatus, bowel 

movement





Objective


                               Vital Signs - 12hr











  06/25/21 06/25/21 06/25/21





  04:37 08:18 12:23


 


Temperature 97.8 F 98.7 F 98.7 F


 


Pulse Rate 98 H 93 H 70


 


Respiratory 20 18 18





Rate   


 


Blood Pressure 135/99 141/107 135/97


 


O2 Sat by Pulse 88 100 93





Oximetry   














- Abdomen


soft, bowel sounds normal (NT, ND)





- Labs





                                 06/25/21 07:40





                                 06/25/21 07:40


                                 Diabetes panel











  06/25/21 Range/Units





  07:40 


 


Sodium  144  D  (137-145)  mmol/L


 


Potassium  5.2 H D  (3.6-5.0)  mmol/L


 


Chloride  112.0 H  ()  mmol/L


 


Carbon Dioxide  26  (22-30)  mmol/L


 


BUN  19  (9-20)  mg/dL


 


Creatinine  0.9  (0.8-1.3)  mg/dL


 


Glucose  108 H  ()  mg/dL


 


Calcium  8.1 L  (8.4-10.2)  mg/dL








                                  Calcium panel











  06/25/21 Range/Units





  07:40 


 


Calcium  8.1 L  (8.4-10.2)  mg/dL








                                 Pituitary panel











  06/25/21 Range/Units





  07:40 


 


Sodium  144  D  (137-145)  mmol/L


 


Potassium  5.2 H D  (3.6-5.0)  mmol/L


 


Chloride  112.0 H  ()  mmol/L


 


Carbon Dioxide  26  (22-30)  mmol/L


 


BUN  19  (9-20)  mg/dL


 


Creatinine  0.9  (0.8-1.3)  mg/dL


 


Glucose  108 H  ()  mg/dL


 


Calcium  8.1 L  (8.4-10.2)  mg/dL








                                  Adrenal panel











  06/25/21 Range/Units





  07:40 


 


Sodium  144  D  (137-145)  mmol/L


 


Potassium  5.2 H D  (3.6-5.0)  mmol/L


 


Chloride  112.0 H  ()  mmol/L


 


Carbon Dioxide  26  (22-30)  mmol/L


 


BUN  19  (9-20)  mg/dL


 


Creatinine  0.9  (0.8-1.3)  mg/dL


 


Glucose  108 H  ()  mg/dL


 


Calcium  8.1 L  (8.4-10.2)  mg/dL














- Imaging


Abdominal x-ray: report reviewed

## 2021-06-25 NOTE — XRAY REPORT
ABDOMEN 3 VIEW(S)



INDICATION / CLINICAL INFORMATION:

sbo.



COMPARISON: 

Yesterday



FINDINGS:



TUBES / LINES: Nasogastric tube has been advanced to the proximal stomach but should be advanced anot
her 5 cm.



BOWEL GAS PATTERN: Persistent mild gaseous distention of the bowel as can be seen with postoperative 
ileus, with mild fold thickening along the left lateral bowel wall which is likely descending colon. 




FREE AIR / EXTRALUMINAL GAS: None seen.



ADDITIONAL FINDINGS: No significant additional findings.







IMPRESSION:

1. Slight interval advancement of the nasogastric tube which should be advanced another 5 cm. Otherwi
se largely unchanged exam.



Signer Name: Nick Taylor MD 

Signed: 6/25/2021 8:09 AM

Workstation Name: [id number]

## 2021-06-25 NOTE — PROGRESS NOTE
Assessment and Plan


May increase IV Lopressor to 5mg q6h if needed for rate control and/or BP 

optimization while pt remains NPO. 


Plan to resume home Toprol when tolerating PO meds. 


Continue anticoagulation with SQ Lovenox. Will revisit initiation of OAC prior 

to discharge when pt is tolerating PO meds. 





Otherwise stable cardiac status. Will see peripherally over the weekend. Please 

do not hesitate to call if any questions or concerns regarding cardiac mgmt 

([phone]).





Pt seen in conjunction with Dr. FAM Murray, who agrees with the assessment and plan

of care. 





- Patient Problems


(1) SBO (small bowel obstruction)


Current Visit: Yes   Status: Acute   





(2) PAF (paroxysmal atrial fibrillation)


Current Visit: Yes   Status: Chronic   





(3) HTN (hypertension)


Current Visit: Yes   Status: Chronic   


Qualifiers: 


   Hypertension type: essential hypertension   Qualified Code(s): I10 - 

Essential (primary) hypertension   





(4) HLD (hyperlipidemia)


Current Visit: Yes   Status: Chronic   


Qualifiers: 


   Hyperlipidemia type: mixed hyperlipidemia   Qualified Code(s): E78.2 - Mixed 

hyperlipidemia   





(5) S/P AAA (abdominal aortic aneurysm) repair


Current Visit: Yes   Status: Chronic   





Subjective


Date of service: 06/25/21


Principal diagnosis: SBO, PAF with RVR


Interval history: 


Tolerated CLD this AM. No cardiac complaints. Tele reviewed - SR 90s w/PACs, no 

events overnight. 





Objective


Last Vital Signs











Temp  98.7 F   06/25/21 16:38


 


Pulse  69   06/25/21 16:38


 


Resp  18   06/25/21 16:38


 


BP  147/109   06/25/21 16:38


 


Pulse Ox  94   06/25/21 16:38











- Physical Examination


General: No Apparent Distress


HEENT: Positive: EOMI, Normocephaly, Mucus Membranes Moist


Neck: Positive: neck supple, trachea midline


Cardiac: Positive: Reg Rate and Rhythm, S1/S2


Lungs: Positive: clear to auscultation


Neuro: Positive: Grossly Intact


Abdomen: Positive: Other (NGT in situ)


Skin: Negative: Rash


Musculoskeletal: No Pain


Extremities: Present: lower extr. pulses.  Absent: edema





- Labs and Meds


                                       CBC











  06/25/21 Range/Units





  07:40 


 


WBC  11.1 H  (4.5-11.0)  K/mm3


 


RBC  5.03  (3.65-5.03)  M/mm3


 


Hgb  16.6 H  (11.8-15.2)  gm/dl


 


Hct  49.0 H  (35.5-45.6)  %


 


Plt Count  231  (140-440)  K/mm3


 


Lymph # (Auto)  1.2  (1.2-5.4)  K/mm3


 


Mono # (Auto)  1.2 H  (0.0-0.8)  K/mm3


 


Eos # (Auto)  0.3  (0.0-0.4)  K/mm3


 


Baso # (Auto)  0.0  (0.0-0.1)  K/mm3








                          Comprehensive Metabolic Panel











  06/25/21 Range/Units





  07:40 


 


Sodium  144  D  (137-145)  mmol/L


 


Potassium  5.2 H D  (3.6-5.0)  mmol/L


 


Chloride  112.0 H  ()  mmol/L


 


Carbon Dioxide  26  (22-30)  mmol/L


 


BUN  19  (9-20)  mg/dL


 


Creatinine  0.9  (0.8-1.3)  mg/dL


 


Glucose  108 H  ()  mg/dL


 


Calcium  8.1 L  (8.4-10.2)  mg/dL














- Imaging and Cardiology


EKG: report reviewed, image reviewed


Echo: report reviewed (6/23/2021 - EF 50-55%, mild pulm HTN, mild TR)





- Telemetry


EKG Rhythm: Sinus Rhythm





- EKG


Sinus rhythms and dysrhythmias: sinus rhythm

## 2021-06-25 NOTE — PROGRESS NOTE
Assessment and Plan


Assessment and plan: 





66-year-old -American male with past medical history significant for 

aortic aneurysm repair, hypertension, penile implant presented to the emergency 

department with complaints of abdominal pain that started 4 days ago.  Patient 

states the pain was sharp, 10 out of 10 intensity, with no radiation, no 

aggravating or alleviating factors identified, patient has associated persistent

nausea and vomiting.  She was not eating and drinking for the last 4 days.  

Patient did not have any bowel movement, could not pass gas.  Patient is to call

his vascular surgeon and told him to go to the emergency department, his 

vascular surgeon stated it is not a vascular problem.


   In the emergency department patient was hypotensive and was given bolus of IV

fluids and blood pressure was corrected.  CT abdomen and pelvis showed small 

bowel obstruction with transition point.  General surgery was consulted.  

Patient has elevated BUN and creatinine and nephrology was consulted.  Patient 

admitted to the floor for further evaluation and management.








High-grade small bowel obstruction


-N.p.o., IV fluids, pain control


-during my examination patient was given pain medication and general pain 

resolved


-General surgery consulted


-We will continue to monitor





Hypotension


-Likely due to poor oral intake, persistent nausea and vomiting


-Patient was given bolus of IV fluids and corrected


-We will continue with normal saline


-Held blood pressure medications





Acute renal failure


-Likely due to dehydration, patient is on IV fluids 


-Nephrology consulted





History of aortic aneurysm repair


-CT did not show acute abnormalities


-H&H is stable








DVT prophylaxis


-SCDs because patient may need surgery





CODE STATUS


-Full


Disposition; admit to medical floor.





Management plan was discussed with the patient and was in agreement with the 

plan of care.








2021


-Blood pressure was low this morning and was given a bolus of normal saline.  

Continue with normal saline at 150 mL/h


-Slight improvement in creatinine level, patient is hypokalemic and was given 

potassium


-Nephrology consult appreciated


-Patient was seen by general surgery and recommend NG tube decompression





2021


-ILEANA is improving, continue with IV fluids.  Nephrology consult appreciated.


-Abdominal x-ray this morning showed worsening of small bowel obstruction, 

patient was evaluated by general surgery and considering to do laparotomy and 

lysis of adhesion, will follow with the surgeon


-Chest x-ray showed 1.7 cm mass on the right perihilar area, I ordered a CT 

chest without contrast


-Urine culture is negative and I stop IV antibiotic





: s/p EX LAP, Continue supportive care, Awaiting surgical re-evaluation, 

Renal function showing some improvement. Continue to monitor. 





: Noted tachycardia and hypotensive. Discussed with nursing staff, will give

a bolus of fluids and obtain EKG. Patient is on metoprolol outpatient, will c

ontinue to hold. Renal function improving. 





: Patient yesterday noted to have Atrial fibrillation with RVR. Started on 

IV metoprolol and Enoxaparin. Will transfer to Telemetry. He remains on strict 

NPO .





21 patient seen and examined.  Patient has 1 bowel movement.  No nausea 

vomiting.  Surgery ordered clear liquid diet from dinner.  BUN 19 creatinine 

0.9.  Potassium 5.2.  Continue current management.    Recheck CBC BMP in the 

morning.  PT evaluation








History


Interval history: 





Patient is seen and examined


Lab and medication reviewed


No bowel movement.  No nausea vomiting.  Patient is on NG tube decompression.


Patient is hungry.





Hospitalist Physical





- Constitutional


Vitals: 


                                        











Temp Pulse Resp BP Pulse Ox


 


 98.7 F   70   18   135/97   93 


 


 21 12:23  21 12:23  21 12:23  21 12:23  21 12:23











General appearance: Present: no acute distress





- EENT


Eyes: Present: PERRL, EOM intact


ENT: hearing intact





- Neck


Neck: Present: supple, normal ROM





- Respiratory


Respiratory effort: normal


Respiratory: bilateral: diminished





- Cardiovascular


Rhythm: regular


Heart Sounds: Present: S1 & S2





- Extremities


Extremities: no ischemia


Peripheral Pulses: within normal limits





- Abdominal


General gastrointestinal: soft, non-tender, normal bowel sounds





- Integumentary


Integumentary: Present: clear, warm





- Psychiatric


Psychiatric: appropriate mood/affect, intact judgment & insight





- Neurologic


Neurologic: CNII-XII intact, moves all extremities





HEART Score





- HEART Score


Troponin: 


                                        











Troponin T  < 0.010 ng/mL (0.00-0.029)   21  07:07    














Results





- Labs


CBC & Chem 7: 


                                 21 07:40





                                 21 07:40


Labs: 


                             Laboratory Last Values











WBC  11.1 K/mm3 (4.5-11.0)  H  21  07:40    


 


RBC  5.03 M/mm3 (3.65-5.03)   21  07:40    


 


Hgb  16.6 gm/dl (11.8-15.2)  H  21  07:40    


 


Hct  49.0 % (35.5-45.6)  H  21  07:40    


 


MCV  98 fl (84-94)  H  21  07:40    


 


MCH  33 pg (28-32)  H  21  07:40    


 


MCHC  34 % (32-34)   21  07:40    


 


RDW  12.7 % (13.2-15.2)  L  21  07:40    


 


Plt Count  231 K/mm3 (140-440)   21  07:40    


 


Lymph % (Auto)  11.2 % (13.4-35.0)  L  21  07:40    


 


Mono % (Auto)  10.8 % (0.0-7.3)  H  21  07:40    


 


Eos % (Auto)  2.9 % (0.0-4.3)   21  07:40    


 


Baso % (Auto)  0.3 % (0.0-1.8)   21  07:40    


 


Lymph # (Auto)  1.2 K/mm3 (1.2-5.4)   21  07:40    


 


Mono # (Auto)  1.2 K/mm3 (0.0-0.8)  H  21  07:40    


 


Eos # (Auto)  0.3 K/mm3 (0.0-0.4)   21  07:40    


 


Baso # (Auto)  0.0 K/mm3 (0.0-0.1)   21  07:40    


 


Add Manual Diff  Complete   21  08:21    


 


Total Counted  100   21  08:21    


 


Seg Neutrophils %  74.8 % (40.0-70.0)  H  21  07:40    


 


Seg Neuts % (Manual)  81.0 % (40.0-70.0)  H  21  08:21    


 


Lymphocytes % (Manual)  6.0 % (13.4-35.0)  L  21  08:21    


 


Monocytes % (Manual)  12.0 % (0.0-7.3)  H  21  08:21    


 


Eosinophils % (Manual)  1.0 % (0.0-4.3)   21  08:21    


 


Nucleated RBC %  Not Reportable   21  08:21    


 


Seg Neutrophils #  8.3 K/mm3 (1.8-7.7)  H  21  07:40    


 


Seg Neutrophils # Man  7.0 K/mm3 (1.8-7.7)   21  08:21    


 


Band Neutrophils #  0.0 K/mm3  21  08:21    


 


Lymphocytes # (Manual)  0.5 K/mm3 (1.2-5.4)  L  21  08:21    


 


Abs React Lymphs (Man)  0.0 K/mm3  21  08:21    


 


Monocytes # (Manual)  1.0 K/mm3 (0.0-0.8)  H  21  08:21    


 


Eosinophils # (Manual)  0.1 K/mm3 (0.0-0.4)   21  08:21    


 


Basophils # (Manual)  0.0 K/mm3 (0.0-0.1)   21  08:21    


 


Metamyelocytes #  0.0 K/mm3  21  08:21    


 


Myelocytes #  0.0 K/mm3  21  08:21    


 


Promyelocytes #  0.0 K/mm3  21  08:21    


 


Blast Cells #  0.0 K/mm3  21  08:21    


 


WBC Morphology  Not Reportable   21  08:21    


 


WBC Morphology  TNR   21  08:21    


 


Hypersegmented Neuts  Not Reportable   21  08:21    


 


Hyposegmented Neuts  Not Reportable   21  08:21    


 


Hypogranular Neuts  Not Reportable   21  08:21    


 


Smudge Cells  Not Reportable   21  08:21    


 


Toxic Granulation  Not Reportable   21  08:21    


 


Toxic Vacuolation  Not Reportable   21  08:21    


 


Dohle Bodies  Not Reportable   21  08:21    


 


Pelger-Huet Anomaly  Not Reportable   21  08:21    


 


Pepito Rods  Not Reportable   21  08:21    


 


Platelet Estimate  Consistent w auto   21  08:21    


 


Clumped Platelets  Not Reportable   21  08:21    


 


Plt Clumps, EDTA  Not Reportable   21  08:21    


 


Large Platelets  Not Reportable   21  08:21    


 


Giant Platelets  Not Reportable   21  08:21    


 


Platelet Satelliting  Not Reportable   21  08:21    


 


Plt Morphology Comment  Not Reportable   21  08:21    


 


RBC Morphology  Not Reportable   21  08:21    


 


Dimorphic RBCs  Not Reportable   21  08:21    


 


Polychromasia  Not Reportable   21  08:21    


 


Hypochromasia  Not Reportable   21  08:21    


 


Poikilocytosis  Not Reportable   21  08:21    


 


Anisocytosis  Not Reportable   21  08:21    


 


Microcytosis  Not Reportable   21  08:21    


 


Macrocytosis  Not Reportable   21  08:21    


 


Spherocytes  Not Reportable   21  08:21    


 


Pappenheimer Bodies  Not Reportable   21  08:21    


 


Sickle Cells  Not Reportable   21  08:21    


 


Target Cells  Not Reportable   21  08:21    


 


Tear Drop Cells  Not Reportable   21  08:21    


 


Ovalocytes  Not Reportable   21  08:21    


 


Helmet Cells  Not Reportable   21  08:21    


 


Tse-Saxon Bodies  Not Reportable   21  08:21    


 


Cabot Rings  Not Reportable   21  08:21    


 


Primitivo Cells  Few   21  08:21    


 


Bite Cells  Not Reportable   21  08:21    


 


Crenated Cell  Not Reportable   21  08:21    


 


Elliptocytes  Few   21  08:21    


 


Acanthocytes (Spur)  Not Reportable   21  08:21    


 


Rouleaux  Not Reportable   21  08:21    


 


Hemoglobin C Crystals  Not Reportable   21  08:21    


 


Schistocytes  Rare   21  08:21    


 


Malaria parasites  Not Reportable   21  08:21    


 


Linus Bodies  Not Reportable   21  08:21    


 


Hem Pathologist Commnt  No   21  08:21    


 


Sodium  144 mmol/L (137-145)  D 21  07:40    


 


Potassium  5.2 mmol/L (3.6-5.0)  H D 21  07:40    


 


Chloride  112.0 mmol/L ()  H  21  07:40    


 


Carbon Dioxide  26 mmol/L (22-30)   21  07:40    


 


Anion Gap  11 mmol/L  21  07:40    


 


BUN  19 mg/dL (9-20)   21  07:40    


 


Creatinine  0.9 mg/dL (0.8-1.3)   21  07:40    


 


Estimated GFR  > 60 ml/min  21  07:40    


 


BUN/Creatinine Ratio  21 %  21  07:40    


 


Glucose  108 mg/dL ()  H  21  07:40    


 


POC Glucose  82 mg/dL ()   21  12:24    


 


Calcium  8.1 mg/dL (8.4-10.2)  L  21  07:40    


 


Phosphorus  5.40 mg/dL (2.5-4.5)  H  21  05:24    


 


Magnesium  2.30 mg/dL (1.7-2.3)   21  07:07    


 


Total Bilirubin  0.80 mg/dL (0.1-1.2)   21  08:21    


 


Direct Bilirubin  0.2 mg/dL (0-0.2)   21  08:21    


 


Indirect Bilirubin  0.6 mg/dL  21  08:21    


 


AST  16 units/L (5-40)   21  08:21    


 


ALT  9 units/L (7-56)   21  08:21    


 


Alkaline Phosphatase  72 units/L ()   21  08:21    


 


Troponin T  < 0.010 ng/mL (0.00-0.029)   21  07:07    


 


Total Protein  8.0 g/dL (6.3-8.2)   21  08:21    


 


Albumin  4.3 g/dL (3.9-5)   21  08:21    


 


Albumin/Globulin Ratio  1.2 %  21  08:21    


 


Lipase  18 units/L (13-60)   21  08:21    


 


Urine Color  Ema  (Yellow)   21  09:23    


 


Urine Turbidity  Cloudy  (Clear)   21  09:23    


 


Urine pH  5.0  (5.0-7.0)   21  09:23    


 


Ur Specific Gravity  1.017  (1.003-1.030)   21  09:23    


 


Urine Protein  100 mg/dl mg/dL (Negative)   21  09:23    


 


Urine Glucose (UA)  Neg mg/dL (Negative)   21  09:23    


 


Urine Ketones  Neg mg/dL (Negative)   21  09:23    


 


Urine Blood  Mod  (Negative)   21  09:23    


 


Urine Nitrite  Neg  (Negative)   21  09:23    


 


Urine Bilirubin  Neg  (Negative)   21  09:23    


 


Urine Urobilinogen  2.0 mg/dL (<2.0)   21  09:23    


 


Ur Leukocyte Esterase  Neg  (Negative)   21  09:23    


 


Urine WBC (Auto)  9.0 /HPF (0.0-6.0)  H  21  09:23    


 


Urine RBC (Auto)  10.0 /HPF (0.0-6.0)   21  09:23    


 


U Epithel Cells (Auto)  2.0 /HPF (0-13.0)   21  09:23    


 


Hyaline Casts  23 /LPF  21  09:23    


 


Urine Mucus  2+ /HPF  21  09:23    


 


Urine Eosinophils  None seen  (None Seen)   21  Unknown


 


Urine Creatinine  198.5 mg/dL (0.1-20.0)  H  21  Unknown


 


Urine Microalbumin  2.6 mg/dL (0.1-34.0)   21  Unknown


 


Microalb/Creat Ratio  13.0 ug/mg  21  Unknown


 


Protein/Creatinin Ratio  0.19   21  Unknown


 


Urine Sodium  11 mmol/L  21  Unknown


 


Urine Total Protein  38 mg/dL (5-11.8)  H  21  Unknown


 


Blood Type  A POSITIVE   21  12:15    


 


Antibody Screen  Negative   21  12:15    











Constantino/IV: 


                                        





Voiding Method                   Urinal











Active Medications





- Current Medications


Current Medications: 














Generic Name Dose Route Start Last Admin





  Trade Name Freq  PRN Reason Stop Dose Admin


 


Enoxaparin Sodium  70 mg  21 17:00  06/25/21 11:37





  Enoxaparin 80 Mg/0.8 Ml Inj  SUB-Q   70 mg





  Q12HR LEO   Administration


 


Hydrophilic Ointment  1 applic  21 09:43  21 17:44





  Lip Therapy Vaseline  TP   1 applic





  AS DIRECT PRN   Administration





  Dry Lips  


 


Dextrose/Sodium Chloride  1,000 mls @ 125 mls/hr  21 05:00  21 00:53





  D5/0.45ns  IV   75 mls/hr





  AS DIRECT LEO   Administration


 


Metoprolol Tartrate  5 mg  21 14:00  21 05:05





  Metoprolol Tartrate 5 Mg/5 Ml Inj  IV   5 mg





  Q8H LEO   Administration


 


Morphine Sulfate  2 mg  21 11:30  21 17:11





  Morphine 2 Mg/1 Ml Inj  IV   2 mg





  Q3H PRN   Administration





  Pain, Moderate (4-6)  


 


Ondansetron HCl  4 mg  21 11:29  21 02:43





  Ondansetron 4 Mg/2 Ml Inj  IV   4 mg





  Q6H PRN   Administration





  N/V unrelieved by Bubba  














Nutrition/Malnutrition Assess





- Dietary Evaluation


Nutrition/Malnutrition Findings: 


                                        





Nutrition Notes                                            Start:  21 

11:22


Freq:                                                      Status: Active       




Protocol:                                                                       




 Document     21 14:52    (Rec: 21 14:55    [id number])


 Nutrition Notes


     Initial or Follow up                        Reassessment


     Current Diagnosis                           Acute Kidney Injury,


                                                 Hypertension,Small Bowel


                                                 Obstruction


     Current Diet                                NPO


     Labs/Tests                                  Na 152


                                                 BUN 28


     Pertinent Medications                       D5 1/2 NS at 75 ml/hr


     Height                                      5 ft 11 in


     Weight                                      74 kg


     Usual Body Weight                           75.45 kg


     Ideal Body Weight (kg)                      78.18


     BMI                                         22.7


     Weight Status                               Appropriate


     Subjective/Other Information                FU for plan of care. Pt


                                                 remanis NPO. Per RN, waiting


                                                 for surgical MD to clear pt


                                                 for diet.


     Burn                                        Absent


     Trauma                                      Absent


     Minimum of two criteria                     No


     Energy Intake (non-severe)                  <75% Estimated Energy


                                                 Requirement >7 days


     #1


      Nutrition Diagnosis                        Altered GI function


      Diagnosis Progress(for reassessment        Continues


       documentation)                            


     Is patient on ventilator?                   No


     Is Patient Ambulatory and/or Out of Bed     Yes


     [facility] [     .269


      [id number]]                               


     Calculation Used for Recommendations        Tyler Gates


     Additional Notes                            Pro needs 0.8-1.2g/k-89g/


                                                 day


                                                 Fluid needs 1ml/kcal


 Nutrition Intervention


     Change Diet Order:                          Diet advancement when


                                                 medically feasible


     Goal #1                                     Advance diet to meet nutrient


                                                 needs


     Anticipated Discharge Needs:                Unable to identify at this


                                                 time


     Follow-Up By:                               21


     Additional Comments                         FU for diet advancement or


                                                 plan of care











- Malnutrition Assessment


Minimum of two criteria: No





- Attestation Statement


I have reviewed and agreed w/ Malnutrition eval & tx plan: No

## 2021-06-25 NOTE — PROGRESS NOTE
Assessment and Plan


High Grade Small Bowel Obstruction


ILEANA possibly 2/2 prerenal etiology in setting of vomiting and poor oral intake, 

hypotension, no obstruction


Hypotension


Hypokalemia


Hx of AAA repair








Plan:


- ILEANA resolved


- hypernatremia resolved


- No hydronephrosis noted on CT AP w/o contrast study on 6/19/21


- Renally dose medications


- Avoid nephrotoxic agents


- Constantino Catheter: No





will sign off.








Subjective


Date of service: 06/25/21


Principal diagnosis: SBO, PAF with RVR


Interval history: 


no overnight events








Objective





- Vital Signs


Vital signs: 


                               Vital Signs - 12hr











  06/25/21 06/25/21





  04:37 08:18


 


Temperature 97.8 F 98.7 F


 


Pulse Rate 98 H 93 H


 


Respiratory 20 18





Rate  


 


Blood Pressure 135/99 141/107


 


O2 Sat by Pulse 88 100





Oximetry  














- Lab





                                 06/25/21 07:40





                                 06/25/21 07:40


                             Most recent lab results











Calcium  8.1 mg/dL (8.4-10.2)  L  06/25/21  07:40    


 


Phosphorus  5.40 mg/dL (2.5-4.5)  H  06/20/21  05:24    


 


Magnesium  2.30 mg/dL (1.7-2.3)   06/24/21  07:07    


 


Urine Creatinine  198.5 mg/dL (0.1-20.0)  H  06/19/21  Unknown


 


Urine Sodium  11 mmol/L  06/19/21  Unknown


 


Urine Total Protein  38 mg/dL (5-11.8)  H  06/19/21  Unknown














Medications & Allergies





- Medications


Allergies/Adverse Reactions: 


                                    Allergies





No Known Allergies Allergy (Verified 06/19/21 15:01)


   








Home Medications: 


                                Home Medications











 Medication  Instructions  Recorded  Confirmed  Last Taken  Type


 


AtorvaSTATin 40 mg PO QHS 06/11/20 06/19/21 Unknown History


 


Metoprolol Succinate 50 mg PO DAILY 06/11/20 06/19/21 1 Day Ago History





    ~06/18/21 











Active Medications: 














Generic Name Dose Route Start Last Admin





  Trade Name Freq  PRN Reason Stop Dose Admin


 


Enoxaparin Sodium  70 mg  06/23/21 17:00  06/25/21 11:37





  Enoxaparin 80 Mg/0.8 Ml Inj  SUB-Q   70 mg





  Q12HR LEO   Administration


 


Hydrophilic Ointment  1 applic  06/22/21 09:43  06/24/21 17:44





  Lip Therapy Vaseline  TP   1 applic





  AS DIRECT PRN   Administration





  Dry Lips  


 


Dextrose/Sodium Chloride  1,000 mls @ 125 mls/hr  06/24/21 05:00  06/25/21 00:53





  D5/0.45ns  IV   75 mls/hr





  AS DIRECT LEO   Administration


 


Metoprolol Tartrate  5 mg  06/24/21 14:00  06/25/21 05:05





  Metoprolol Tartrate 5 Mg/5 Ml Inj  IV   5 mg





  Q8H LEO   Administration


 


Morphine Sulfate  2 mg  06/19/21 11:30  06/22/21 17:11





  Morphine 2 Mg/1 Ml Inj  IV   2 mg





  Q3H PRN   Administration





  Pain, Moderate (4-6)  


 


Ondansetron HCl  4 mg  06/19/21 11:29  06/20/21 02:43





  Ondansetron 4 Mg/2 Ml Inj  IV   4 mg





  Q6H PRN   Administration





  N/V unrelieved by Reglan

## 2021-06-26 LAB
BASOPHILS # (AUTO): 0 K/MM3 (ref 0–0.1)
BASOPHILS NFR BLD AUTO: 0.4 % (ref 0–1.8)
BUN SERPL-MCNC: 14 MG/DL (ref 9–20)
BUN/CREAT SERPL: 16 %
CALCIUM SERPL-MCNC: 7.7 MG/DL (ref 8.4–10.2)
EOSINOPHIL # BLD AUTO: 0.4 K/MM3 (ref 0–0.4)
EOSINOPHIL NFR BLD AUTO: 4 % (ref 0–4.3)
HCT VFR BLD CALC: 39 % (ref 35.5–45.6)
HEMOLYSIS INDEX: 6
HGB BLD-MCNC: 14 GM/DL (ref 11.8–15.2)
LYMPHOCYTES # BLD AUTO: 1.4 K/MM3 (ref 1.2–5.4)
LYMPHOCYTES NFR BLD AUTO: 15.2 % (ref 13.4–35)
MCHC RBC AUTO-ENTMCNC: 36 % (ref 32–34)
MCV RBC AUTO: 97 FL (ref 84–94)
MONOCYTES # (AUTO): 1.1 K/MM3 (ref 0–0.8)
MONOCYTES % (AUTO): 11.8 % (ref 0–7.3)
PLATELET # BLD: 212 K/MM3 (ref 140–440)
RBC # BLD AUTO: 4.01 M/MM3 (ref 3.65–5.03)

## 2021-06-26 RX ADMIN — ENOXAPARIN SODIUM SCH MG: 100 INJECTION SUBCUTANEOUS at 11:50

## 2021-06-26 RX ADMIN — ENOXAPARIN SODIUM SCH MG: 100 INJECTION SUBCUTANEOUS at 22:15

## 2021-06-26 RX ADMIN — METOPROLOL TARTRATE SCH MG: 5 INJECTION INTRAVENOUS at 05:35

## 2021-06-26 RX ADMIN — MAGNESIUM HYDROXIDE SCH ML: 400 SUSPENSION ORAL at 11:50

## 2021-06-26 RX ADMIN — METOPROLOL TARTRATE SCH MG: 5 INJECTION INTRAVENOUS at 22:16

## 2021-06-26 RX ADMIN — DEXTROSE AND SODIUM CHLORIDE SCH MLS/HR: 5; .45 INJECTION, SOLUTION INTRAVENOUS at 05:35

## 2021-06-26 RX ADMIN — METOPROLOL TARTRATE SCH: 5 INJECTION INTRAVENOUS at 17:29

## 2021-06-26 NOTE — PROGRESS NOTE
Assessment and Plan





- Patient Problems


(1) SBO (small bowel obstruction)


Current Visit: Yes   Status: Acute   


Plan to address problem: 


1) FLD


 2) DC Morphine


 3) Percocet


 4) Discharge soon (24 hours) from my perspective.








Subjective


Date of service: 06/26/21


Patient Reports: Positive: no new complaints, pain is less, tolerating liquids 

well, flatus, bowel movement





Objective


                               Vital Signs - 12hr











  06/26/21 06/26/21 06/26/21





  03:00 04:33 05:35


 


Temperature  98.8 F 


 


Pulse Rate  86 82


 


Respiratory 20 18 





Rate   


 


Blood Pressure  114/78 114/78


 


O2 Sat by Pulse 98 95 





Oximetry   














  06/26/21





  08:18


 


Temperature 98.7 F


 


Pulse Rate 92 H


 


Respiratory 18





Rate 


 


Blood Pressure 112/87


 


O2 Sat by Pulse 97





Oximetry 














- Abdomen


soft, bowel sounds normal, other (ND, NT)





- Labs





                                 06/26/21 05:01





                                 06/26/21 05:01


                                 Diabetes panel











  06/26/21 Range/Units





  05:01 


 


Sodium  141  (137-145)  mmol/L


 


Potassium  3.3 L D  (3.6-5.0)  mmol/L


 


Chloride  105.1  ()  mmol/L


 


Carbon Dioxide  29  (22-30)  mmol/L


 


BUN  14  (9-20)  mg/dL


 


Creatinine  0.9  (0.8-1.3)  mg/dL


 


Glucose  107 H  ()  mg/dL


 


Calcium  7.7 L  (8.4-10.2)  mg/dL








                                  Calcium panel











  06/26/21 Range/Units





  05:01 


 


Calcium  7.7 L  (8.4-10.2)  mg/dL








                                 Pituitary panel











  06/26/21 Range/Units





  05:01 


 


Sodium  141  (137-145)  mmol/L


 


Potassium  3.3 L D  (3.6-5.0)  mmol/L


 


Chloride  105.1  ()  mmol/L


 


Carbon Dioxide  29  (22-30)  mmol/L


 


BUN  14  (9-20)  mg/dL


 


Creatinine  0.9  (0.8-1.3)  mg/dL


 


Glucose  107 H  ()  mg/dL


 


Calcium  7.7 L  (8.4-10.2)  mg/dL








                                  Adrenal panel











  06/26/21 Range/Units





  05:01 


 


Sodium  141  (137-145)  mmol/L


 


Potassium  3.3 L D  (3.6-5.0)  mmol/L


 


Chloride  105.1  ()  mmol/L


 


Carbon Dioxide  29  (22-30)  mmol/L


 


BUN  14  (9-20)  mg/dL


 


Creatinine  0.9  (0.8-1.3)  mg/dL


 


Glucose  107 H  ()  mg/dL


 


Calcium  7.7 L  (8.4-10.2)  mg/dL














- Imaging


Abdominal x-ray: report reviewed

## 2021-06-26 NOTE — XRAY REPORT
Abdomen 2 views



INDICATION: Follow-up ileus



IMPRESSION: When compared to yesterday's exam there has been no significant interval change. Midline 
abdominal staples noted.



Signer Name: Maikol Mejia MD 

Signed: 6/26/2021 7:48 AM

Workstation Name: [id number]

## 2021-06-26 NOTE — PROGRESS NOTE
Assessment and Plan


Assessment and plan: 





66-year-old -American male with past medical history significant for 

aortic aneurysm repair, hypertension, penile implant presented to the emergency 

department with complaints of abdominal pain that started 4 days ago.  Patient 

states the pain was sharp, 10 out of 10 intensity, with no radiation, no 

aggravating or alleviating factors identified, patient has associated persistent

nausea and vomiting.  She was not eating and drinking for the last 4 days.  

Patient did not have any bowel movement, could not pass gas.  Patient is to call

his vascular surgeon and told him to go to the emergency department, his 

vascular surgeon stated it is not a vascular problem.


   In the emergency department patient was hypotensive and was given bolus of IV

fluids and blood pressure was corrected.  CT abdomen and pelvis showed small 

bowel obstruction with transition point.  General surgery was consulted.  

Patient has elevated BUN and creatinine and nephrology was consulted.  Patient 

admitted to the floor for further evaluation and management.








High-grade small bowel obstruction


-N.p.o., IV fluids, pain control


-during my examination patient was given pain medication and general pain 

resolved


-General surgery consulted


-We will continue to monitor





Hypotension


-Likely due to poor oral intake, persistent nausea and vomiting


-Patient was given bolus of IV fluids and corrected


-We will continue with normal saline


-Held blood pressure medications





Acute renal failure


-Likely due to dehydration, patient is on IV fluids 


-Nephrology consulted





History of aortic aneurysm repair


-CT did not show acute abnormalities


-H&H is stable








DVT prophylaxis


-SCDs because patient may need surgery





CODE STATUS


-Full


Disposition; admit to medical floor.





Management plan was discussed with the patient and was in agreement with the 

plan of care.








6/20/2021


-Blood pressure was low this morning and was given a bolus of normal saline.  

Continue with normal saline at 150 mL/h


-Slight improvement in creatinine level, patient is hypokalemic and was given 

potassium


-Nephrology consult appreciated


-Patient was seen by general surgery and recommend NG tube decompression





6/21/2021


-ILEANA is improving, continue with IV fluids.  Nephrology consult appreciated.


-Abdominal x-ray this morning showed worsening of small bowel obstruction, 

patient was evaluated by general surgery and considering to do laparotomy and 

lysis of adhesion, will follow with the surgeon


-Chest x-ray showed 1.7 cm mass on the right perihilar area, I ordered a CT 

chest without contrast


-Urine culture is negative and I stop IV antibiotic





6/22: s/p EX LAP, Continue supportive care, Awaiting surgical re-evaluation, 

Renal function showing some improvement. Continue to monitor. 





6/23: Noted tachycardia and hypotensive. Discussed with nursing staff, will give

a bolus of fluids and obtain EKG. Patient is on metoprolol outpatient, will c

ontinue to hold. Renal function improving. 





6/24: Patient yesterday noted to have Atrial fibrillation with RVR. Started on 

IV metoprolol and Enoxaparin. Will transfer to Telemetry. He remains on strict 

NPO .





06/25/21 patient seen and examined.  Patient has 1 bowel movement.  No nausea 

vomiting.  Surgery ordered clear liquid diet from dinner.  BUN 19 creatinine 

0.9.  Potassium 5.2.  Continue current management.    Recheck CBC BMP in the 

morning.  PT evaluation








06/26/21 patient is seen and examined.  Patient feels better.  Patient has 1 

Nieves bowel movement.  No nausea vomiting.  Full liquid diet.  Out of bed to 

chair.  Potassium is 3.3.  KCl 40 mg p.o. x1 dose.  Continue current management.

 Discharge plan when cleared by surgery.  PT evaluation








History


Interval history: 





Patient is seen and examined


Lab and medication reviewed


Patient has 1 Nieves bowel movement.  Patient feels better no nausea vomiting





Hospitalist Physical





- Constitutional


Vitals: 


                                        











Temp Pulse Resp BP Pulse Ox


 


 98.7 F   92 H  18   112/87   97 


 


 06/26/21 08:18  06/26/21 08:18  06/26/21 08:18  06/26/21 08:18  06/26/21 08:18











General appearance: Present: no acute distress





- EENT


Eyes: Present: PERRL, EOM intact


ENT: hearing intact, clear oral mucosa, dentition normal





- Neck


Neck: Present: supple, normal ROM





- Respiratory


Respiratory effort: normal


Respiratory: bilateral: diminished





- Cardiovascular


Rhythm: regular


Heart Sounds: Present: S1 & S2





- Extremities


Extremities: no ischemia


Peripheral Pulses: within normal limits





- Abdominal


General gastrointestinal: soft, non-tender, normal bowel sounds





- Integumentary


Integumentary: Present: clear, warm, dry





- Psychiatric


Psychiatric: appropriate mood/affect, intact judgment & insight





- Neurologic


Neurologic: CNII-XII intact, moves all extremities





HEART Score





- HEART Score


Troponin: 


                                        











Troponin T  < 0.010 ng/mL (0.00-0.029)   06/24/21  07:07    














Results





- Labs


CBC & Chem 7: 


                                 06/26/21 05:01





                                 06/26/21 05:01


Labs: 


                             Laboratory Last Values











WBC  9.0 K/mm3 (4.5-11.0)   06/26/21  05:01    


 


RBC  4.01 M/mm3 (3.65-5.03)   06/26/21  05:01    


 


Hgb  14.0 gm/dl (11.8-15.2)   06/26/21  05:01    


 


Hct  39.0 % (35.5-45.6)  D 06/26/21  05:01    


 


MCV  97 fl (84-94)  H  06/26/21  05:01    


 


MCH  35 pg (28-32)  H  06/26/21  05:01    


 


MCHC  36 % (32-34)  H  06/26/21  05:01    


 


RDW  12.7 % (13.2-15.2)  L  06/26/21  05:01    


 


Plt Count  212 K/mm3 (140-440)   06/26/21  05:01    


 


Lymph % (Auto)  15.2 % (13.4-35.0)   06/26/21  05:01    


 


Mono % (Auto)  11.8 % (0.0-7.3)  H  06/26/21  05:01    


 


Eos % (Auto)  4.0 % (0.0-4.3)   06/26/21  05:01    


 


Baso % (Auto)  0.4 % (0.0-1.8)   06/26/21  05:01    


 


Lymph # (Auto)  1.4 K/mm3 (1.2-5.4)   06/26/21  05:01    


 


Mono # (Auto)  1.1 K/mm3 (0.0-0.8)  H  06/26/21  05:01    


 


Eos # (Auto)  0.4 K/mm3 (0.0-0.4)   06/26/21  05:01    


 


Baso # (Auto)  0.0 K/mm3 (0.0-0.1)   06/26/21  05:01    


 


Add Manual Diff  Complete   06/19/21  08:21    


 


Total Counted  100   06/19/21  08:21    


 


Seg Neutrophils %  68.6 % (40.0-70.0)   06/26/21  05:01    


 


Seg Neuts % (Manual)  81.0 % (40.0-70.0)  H  06/19/21  08:21    


 


Lymphocytes % (Manual)  6.0 % (13.4-35.0)  L  06/19/21  08:21    


 


Monocytes % (Manual)  12.0 % (0.0-7.3)  H  06/19/21  08:21    


 


Eosinophils % (Manual)  1.0 % (0.0-4.3)   06/19/21  08:21    


 


Nucleated RBC %  Not Reportable   06/19/21  08:21    


 


Seg Neutrophils #  6.2 K/mm3 (1.8-7.7)   06/26/21  05:01    


 


Seg Neutrophils # Man  7.0 K/mm3 (1.8-7.7)   06/19/21  08:21    


 


Band Neutrophils #  0.0 K/mm3  06/19/21  08:21    


 


Lymphocytes # (Manual)  0.5 K/mm3 (1.2-5.4)  L  06/19/21  08:21    


 


Abs React Lymphs (Man)  0.0 K/mm3  06/19/21  08:21    


 


Monocytes # (Manual)  1.0 K/mm3 (0.0-0.8)  H  06/19/21  08:21    


 


Eosinophils # (Manual)  0.1 K/mm3 (0.0-0.4)   06/19/21  08:21    


 


Basophils # (Manual)  0.0 K/mm3 (0.0-0.1)   06/19/21  08:21    


 


Metamyelocytes #  0.0 K/mm3  06/19/21  08:21    


 


Myelocytes #  0.0 K/mm3  06/19/21  08:21    


 


Promyelocytes #  0.0 K/mm3  06/19/21  08:21    


 


Blast Cells #  0.0 K/mm3  06/19/21  08:21    


 


WBC Morphology  Not Reportable   06/19/21  08:21    


 


WBC Morphology  TNR   06/19/21  08:21    


 


Hypersegmented Neuts  Not Reportable   06/19/21  08:21    


 


Hyposegmented Neuts  Not Reportable   06/19/21  08:21    


 


Hypogranular Neuts  Not Reportable   06/19/21  08:21    


 


Smudge Cells  Not Reportable   06/19/21  08:21    


 


Toxic Granulation  Not Reportable   06/19/21  08:21    


 


Toxic Vacuolation  Not Reportable   06/19/21  08:21    


 


Dohle Bodies  Not Reportable   06/19/21  08:21    


 


Pelger-Huet Anomaly  Not Reportable   06/19/21  08:21    


 


Pepito Rods  Not Reportable   06/19/21  08:21    


 


Platelet Estimate  Consistent w auto   06/19/21  08:21    


 


Clumped Platelets  Not Reportable   06/19/21  08:21    


 


Plt Clumps, EDTA  Not Reportable   06/19/21  08:21    


 


Large Platelets  Not Reportable   06/19/21  08:21    


 


Giant Platelets  Not Reportable   06/19/21  08:21    


 


Platelet Satelliting  Not Reportable   06/19/21  08:21    


 


Plt Morphology Comment  Not Reportable   06/19/21  08:21    


 


RBC Morphology  Not Reportable   06/19/21  08:21    


 


Dimorphic RBCs  Not Reportable   06/19/21  08:21    


 


Polychromasia  Not Reportable   06/19/21  08:21    


 


Hypochromasia  Not Reportable   06/19/21  08:21    


 


Poikilocytosis  Not Reportable   06/19/21  08:21    


 


Anisocytosis  Not Reportable   06/19/21  08:21    


 


Microcytosis  Not Reportable   06/19/21  08:21    


 


Macrocytosis  Not Reportable   06/19/21  08:21    


 


Spherocytes  Not Reportable   06/19/21  08:21    


 


Pappenheimer Bodies  Not Reportable   06/19/21  08:21    


 


Sickle Cells  Not Reportable   06/19/21  08:21    


 


Target Cells  Not Reportable   06/19/21  08:21    


 


Tear Drop Cells  Not Reportable   06/19/21  08:21    


 


Ovalocytes  Not Reportable   06/19/21  08:21    


 


Helmet Cells  Not Reportable   06/19/21  08:21    


 


[place] Bodies  Not Reportable   06/19/21  08:21    


 


Cabot Rings  Not Reportable   06/19/21  08:21    


 


[place] Cells  Few   06/19/21  08:21    


 


Bite Cells  Not Reportable   06/19/21  08:21    


 


Crenated Cell  Not Reportable   06/19/21  08:21    


 


Elliptocytes  Few   06/19/21  08:21    


 


Acanthocytes (Spur)  Not Reportable   06/19/21  08:21    


 


Rouleaux  Not Reportable   06/19/21  08:21    


 


Hemoglobin C Crystals  Not Reportable   06/19/21  08:21    


 


Schistocytes  Rare   06/19/21  08:21    


 


Malaria parasites  Not Reportable   06/19/21  08:21    


 


Linus Bodies  Not Reportable   06/19/21  08:21    


 


Hem Pathologist Commnt  No   06/19/21  08:21    


 


Sodium  141 mmol/L (137-145)   06/26/21  05:01    


 


Potassium  3.3 mmol/L (3.6-5.0)  L D 06/26/21  05:01    


 


Chloride  105.1 mmol/L ()   06/26/21  05:01    


 


Carbon Dioxide  29 mmol/L (22-30)   06/26/21  05:01    


 


Anion Gap  10 mmol/L  06/26/21  05:01    


 


BUN  14 mg/dL (9-20)   06/26/21  05:01    


 


Creatinine  0.9 mg/dL (0.8-1.3)   06/26/21  05:01    


 


Estimated GFR  > 60 ml/min  06/26/21  05:01    


 


BUN/Creatinine Ratio  16 %  06/26/21  05:01    


 


Glucose  107 mg/dL ()  H  06/26/21  05:01    


 


POC Glucose  82 mg/dL ()   06/25/21  12:24    


 


Calcium  7.7 mg/dL (8.4-10.2)  L  06/26/21  05:01    


 


Phosphorus  5.40 mg/dL (2.5-4.5)  H  06/20/21  05:24    


 


Magnesium  2.30 mg/dL (1.7-2.3)   06/24/21  07:07    


 


Total Bilirubin  0.80 mg/dL (0.1-1.2)   06/19/21  08:21    


 


Direct Bilirubin  0.2 mg/dL (0-0.2)   06/19/21  08:21    


 


Indirect Bilirubin  0.6 mg/dL  06/19/21  08:21    


 


AST  16 units/L (5-40)   06/19/21  08:21    


 


ALT  9 units/L (7-56)   06/19/21  08:21    


 


Alkaline Phosphatase  72 units/L ()   06/19/21  08:21    


 


Troponin T  < 0.010 ng/mL (0.00-0.029)   06/24/21  07:07    


 


Total Protein  8.0 g/dL (6.3-8.2)   06/19/21  08:21    


 


Albumin  4.3 g/dL (3.9-5)   06/19/21  08:21    


 


Albumin/Globulin Ratio  1.2 %  06/19/21  08:21    


 


Lipase  18 units/L (13-60)   06/19/21  08:21    


 


Urine Color  Ema  (Yellow)   06/19/21  09:23    


 


Urine Turbidity  Cloudy  (Clear)   06/19/21  09:23    


 


Urine pH  5.0  (5.0-7.0)   06/19/21  09:23    


 


Ur Specific Gravity  1.017  (1.003-1.030)   06/19/21  09:23    


 


Urine Protein  100 mg/dl mg/dL (Negative)   06/19/21  09:23    


 


Urine Glucose (UA)  Neg mg/dL (Negative)   06/19/21  09:23    


 


Urine Ketones  Neg mg/dL (Negative)   06/19/21  09:23    


 


Urine Blood  Mod  (Negative)   06/19/21  09:23    


 


Urine Nitrite  Neg  (Negative)   06/19/21  09:23    


 


Urine Bilirubin  Neg  (Negative)   06/19/21  09:23    


 


Urine Urobilinogen  2.0 mg/dL (<2.0)   06/19/21  09:23    


 


Ur Leukocyte Esterase  Neg  (Negative)   06/19/21  09:23    


 


Urine WBC (Auto)  9.0 /HPF (0.0-6.0)  H  06/19/21  09:23    


 


Urine RBC (Auto)  10.0 /HPF (0.0-6.0)   06/19/21  09:23    


 


U Epithel Cells (Auto)  2.0 /HPF (0-13.0)   06/19/21  09:23    


 


Hyaline Casts  23 /LPF  06/19/21  09:23    


 


Urine Mucus  2+ /HPF  06/19/21  09:23    


 


Urine Eosinophils  None seen  (None Seen)   06/19/21  Unknown


 


Urine Creatinine  198.5 mg/dL (0.1-20.0)  H  06/19/21  Unknown


 


Urine Microalbumin  2.6 mg/dL (0.1-34.0)   06/19/21  Unknown


 


Microalb/Creat Ratio  13.0 ug/mg  06/19/21  Unknown


 


Protein/Creatinin Ratio  0.19   06/19/21  Unknown


 


Urine Sodium  11 mmol/L  06/19/21  Unknown


 


Urine Total Protein  38 mg/dL (5-11.8)  H  06/19/21  Unknown


 


Blood Type  A POSITIVE   06/21/21  12:15    


 


Antibody Screen  Negative   06/21/21  12:15    











Constantino/IV: 


                                        





Voiding Method                   Urinal











Active Medications





- Current Medications


Current Medications: 














Generic Name Dose Route Start Last Admin





  Trade Name Freq  PRN Reason Stop Dose Admin


 


Enoxaparin Sodium  70 mg  06/23/21 17:00  06/26/21 11:50





  Enoxaparin 80 Mg/0.8 Ml Inj  SUB-Q   70 mg





  Q12HR LEO   Administration


 


Hydrophilic Ointment  1 applic  06/22/21 09:43  06/24/21 17:44





  Lip Therapy Vaseline  TP   1 applic





  AS DIRECT PRN   Administration





  Dry Lips  


 


Dextrose/Sodium Chloride  1,000 mls @ 125 mls/hr  06/24/21 05:00  06/26/21 05:35





  D5/0.45ns  IV   75 mls/hr





  AS DIRECT LEO   Administration


 


Magnesium Hydroxide  30 ml  06/25/21 14:00  06/26/21 11:50





  Magnesium Hydroxide (Mom) Oral Liqd Udc  PO   30 ml





  QDAY LEO   Administration


 


Metoprolol Tartrate  5 mg  06/24/21 14:00  06/26/21 05:35





  Metoprolol Tartrate 5 Mg/5 Ml Inj  IV   5 mg





  Q8H LEO   Administration


 


Ondansetron HCl  4 mg  06/19/21 11:29  06/20/21 02:43





  Ondansetron 4 Mg/2 Ml Inj  IV   4 mg





  Q6H PRN   Administration





  N/V unrelieved by Reglan  


 


Oxycodone/Acetaminophen  1 tab  06/26/21 11:49 





  Oxycodone /Acetaminophen 5-325mg Tab  PO  





  Q4H PRN  





  Pain, Moderate (4-6)  


 


Potassium Chloride  40 meq  06/26/21 12:23 





  Potassium Chloride Er 20 Meq Tab  PO  06/26/21 12:24 





  ONCE ONE  














Nutrition/Malnutrition Assess





- Dietary Evaluation


Nutrition/Malnutrition Findings: 


                                        





Nutrition Notes                                            Start:  06/21/21 

11:22


Freq:                                                      Status: Active       




Protocol:                                                                       




 Document     06/25/21 12:42  LM  (Rec: 06/25/21 12:44  LM  [id number])


 Nutrition Notes


     Initial or Follow up                        Brief Note


     Current Diet                                NPO


     Weight Status                               Appropriate


     Subjective/Other Information                Pt remains NPO.


 Nutrition Intervention


     Change Diet Order:                          Diet advancement when


                                                 medically feasible


     Goal #1                                     Advance diet to meet nutrient


                                                 needs


     Anticipated Discharge Needs:                Unable to identify at this


                                                 time


     Follow-Up By:                               06/28/21


     Additional Comments                         FU for diet advancement or


                                                 plan of care











- Malnutrition Assessment


Minimum of two criteria: Yes

## 2021-06-27 LAB
APTT BLD: 36.3 SEC. (ref 24.2–36.6)
HCT VFR BLD CALC: 38.2 % (ref 35.5–45.6)
HGB BLD-MCNC: 13.6 GM/DL (ref 11.8–15.2)
INR PPP: 1.35 (ref 0.87–1.13)
MCHC RBC AUTO-ENTMCNC: 36 % (ref 32–34)
MCV RBC AUTO: 96 FL (ref 84–94)
PLATELET # BLD: 229 K/MM3 (ref 140–440)
RBC # BLD AUTO: 3.96 M/MM3 (ref 3.65–5.03)

## 2021-06-27 RX ADMIN — APIXABAN SCH MG: 5 TABLET, FILM COATED ORAL at 10:11

## 2021-06-27 RX ADMIN — METOPROLOL SUCCINATE SCH MG: 50 TABLET, EXTENDED RELEASE ORAL at 10:11

## 2021-06-27 RX ADMIN — MAGNESIUM HYDROXIDE SCH: 400 SUSPENSION ORAL at 10:11

## 2021-06-27 RX ADMIN — OXYCODONE AND ACETAMINOPHEN PRN TAB: 5; 325 TABLET ORAL at 21:39

## 2021-06-27 RX ADMIN — OXYCODONE AND ACETAMINOPHEN PRN TAB: 5; 325 TABLET ORAL at 05:57

## 2021-06-27 RX ADMIN — DEXTROSE AND SODIUM CHLORIDE SCH MLS/HR: 5; .45 INJECTION, SOLUTION INTRAVENOUS at 05:27

## 2021-06-27 RX ADMIN — METOPROLOL TARTRATE SCH MG: 5 INJECTION INTRAVENOUS at 05:27

## 2021-06-27 RX ADMIN — APIXABAN SCH MG: 5 TABLET, FILM COATED ORAL at 21:39

## 2021-06-27 NOTE — DISCHARGE SUMMARY
Providers





- Providers


Date of Admission: 


06/19/21 12:04





Attending physician: 


GHADA IRAHETA MD





                                        





06/19/21 10:31


Consult to Physician [CONS] Stat 


   Comment: 


   Consulting Provider: KIERA WOOTEN


   Physician Instructions: 


   Reason For Exam: SBO





06/19/21 10:53


Consult to Physician [CONS] Stat 


   Comment: 


   Consulting Provider: SALEEM CHAVES


   Physician Instructions: 


   Reason For Exam: acute renal failure





06/22/21 17:14


Physical Therapy Evaluation and Treat [CONS] Routine 


   Comment: 


   Reason For Exam: weakness





06/23/21 00:34


Consult to Physician [CONS] Routine 


   Comment: 


   Consulting Provider: [facility]


   Physician Instructions: 


   Reason For Exam: afib





06/23/21 10:19


Occupational Therapy Evaluate and Treat [CONS] Routine 


   Comment: 


   Reason For Exam: weakness











Primary care physician: 


PRIMARY CARE MD








Hospitalization


Reason for admission: Small bowel obstruction


Condition: Stable


Hospital course: 


66-year-old -American male with past medical history significant for 

aortic aneurysm repair, hypertension, penile implant presented to the emergency 

department with complaints of abdominal pain that started 4 days ago.  Patient 

states the pain was sharp, 10 out of 10 intensity, with no radiation, no 

aggravating or alleviating factors identified, patient has associated persistent

 nausea and vomiting.  She was not eating and drinking for the last 4 days.  

Patient did not have any bowel movement, could not pass gas.  Patient is to call

 his vascular surgeon and told him to go to the emergency department, his 

vascular surgeon stated it is not a vascular problem.


   In the emergency department patient was hypotensive and was given bolus of IV

 fluids and blood pressure was corrected.  CT abdomen and pelvis showed small 

bowel obstruction with transition point.  General surgery was consulted.  

Patient has elevated BUN and creatinine and nephrology was consulted.  Patient 

admitted to the floor for further evaluation and management.








Management plan was discussed with the patient and was in agreement with the 

plan of care.








6/20/2021


-Blood pressure was low this morning and was given a bolus of normal saline.  

Continue with normal saline at 150 mL/h


-Slight improvement in creatinine level, patient is hypokalemic and was given 

potassium


-Nephrology consult appreciated


-Patient was seen by general surgery and recommend NG tube decompression





6/21/2021


-ILEANA is improving, continue with IV fluids.  Nephrology consult appreciated.


-Abdominal x-ray this morning showed worsening of small bowel obstruction, 

patient was evaluated by general surgery and considering to do laparotomy and 

lysis of adhesion, will follow with the surgeon


-Chest x-ray showed 1.7 cm mass on the right perihilar area, I ordered a CT 

chest without contrast


-Urine culture is negative and I stop IV antibiotic





6/22: s/p EX LAP, Continue supportive care, Awaiting surgical re-evaluation, 

Renal function showing some improvement. Continue to monitor. 





6/23: Noted tachycardia and hypotensive. Discussed with nursing staff, will give

 a bolus of fluids and obtain EKG. Patient is on metoprolol outpatient, will 

continue to hold. Renal function improving. 





6/24: Patient yesterday noted to have Atrial fibrillation with RVR. Started on 

IV metoprolol and Enoxaparin. Will transfer to Telemetry. He remains on strict 

NPO .





06/25/21 patient seen and examined.  Patient has 1 bowel movement.  No nausea 

vomiting.  Surgery ordered clear liquid diet from dinner.  BUN 19 creatinine 

0.9.  Potassium 5.2.  Continue current management.    Recheck CBC BMP in the 

morning.  PT evaluation








06/26/21 patient is seen and examined.  Patient feels better.  Patient has 1 

Nieves bowel movement.  No nausea vomiting.  Full liquid diet.  Out of bed to 

chair.  Potassium is 3.3.  KCl 40 mg p.o. x1 dose.  Continue current management.

  Discharge plan when cleared by surgery.  PT evaluation





6/27: Patient continues to tolerate full liquid diet.  No new complaints this 

morning bowel movement.  No symptomatic bradycardia noted.  I discussed changes 

to his medication including addition of Eliquis and side effects profile 

precautions including fall precautions patient verbalized understanding of also 

discussed dietary management including following surgeon's recommendation for 

advancement of diet and need to follow-up with the surgeon for removal of 

staples he verbalized understanding.  He will be discharged today if okay with 

the surgeon














High-grade small bowel obstruction S/P Enterolysis





Hypotension





Acute renal failure secondary to vasomotor nephropathy








History of aortic aneurysm repair








Disposition: DC-30 STILL A PATIENT


Final Discharge Diagnosis (Prints w/discharge instructions): Small bowel 

obstruction


Time spent for discharge: 35 MINS





Core Measure Documentation





- Palliative Care


Palliative Care/ Comfort Measures: Not Applicable





- Core Measures


Any of the following diagnoses?: none





Exam





- Physical Exam


Narrative exam: 


 Not in cardiopulmonary distress. 


 The patient appeared well nourished and normally developed. 


 Vital signs as documented.


 Head exam is unremarkable.


 No scleral icterus .


 Neck is without jugular venous distension, thyromegaly, or carotid bruits. 


 Lungs are clear to auscultation.


Cardiac exam reveals regular rate and  Rhythm. 


Abdominal exam reveals normal bowel sounds, nontender, no organomegaly.  

Surgical staples in place


Extremities are nonedematous and both femoral and pedal pulses are normal.


CNS: Alert and oriented 3.  No focal weakness.

















- Constitutional


Vitals: 


                                        











Temp Pulse Resp BP Pulse Ox


 


 98.6 F   88   19   128/88   95 


 


 06/27/21 08:17  06/27/21 08:17  06/27/21 08:17  06/27/21 08:17  06/27/21 08:17














Plan


Activity: advance as tolerated, fall precautions


Diet: advance as tolerated (Per surgeon's direction)


Wound: per your surgeon's advice, per wound nurse instructions


Special Instructions: record daily weights, record daily BP diary


Follow up with: 


PRIMARY CARE,MD [Primary Care Provider] - 3-5 Days


KIERA WOOTEN MD [Staff Physician] - 7 Days


JULIEN GOODMAN MD [Staff Physician] - 7 Days


Prescriptions: 


Apixaban [Eliquis] 5 mg PO Q12HR #60 tablet


Metoprolol Xl [Metoprolol SUCCINATE ER TAB] 50 mg PO QDAY #30 tablet


oxyCODONE /ACETAMINOPHEN [Percocet 5/325 mg] 1 tab PO Q4H PRN #14 tablet


 PRN Reason: Pain, Moderate (4-6)


Sennosides Tab [Senokot] 8.6 mg PO BID #60 tablet

## 2021-06-28 VITALS — SYSTOLIC BLOOD PRESSURE: 128 MMHG | DIASTOLIC BLOOD PRESSURE: 88 MMHG

## 2021-06-28 RX ADMIN — METOPROLOL SUCCINATE SCH MG: 50 TABLET, EXTENDED RELEASE ORAL at 08:59

## 2021-06-28 RX ADMIN — MAGNESIUM HYDROXIDE SCH ML: 400 SUSPENSION ORAL at 08:59

## 2021-06-28 RX ADMIN — APIXABAN SCH MG: 5 TABLET, FILM COATED ORAL at 08:58

## 2021-06-28 NOTE — PROGRESS NOTE
Assessment and Plan





A. fib with RVR


* Continue rate control with metoprolol XL 50 mg daily


* Anticoagulation with Eliquis 5 mg twice daily





DVT prophylaxis


* Lovenox SQ





Patient is currently stable cardiac status.  Patient may discharge from 

cardiology standpoint.  Will follow on as-needed basis


Patient should follow-up with Dr. CARRIE Murray in our [place] location on 

7/23/2021 at 1:30 PM.  #[phone]





This patient was seen in conjunction with Dr Prabhakar who agrees with assessment 

and plan of care





- Patient Problems


(1) Atrial fibrillation with RVR


Current Visit: Yes   Status: Chronic   





(2) SBO (small bowel obstruction)


Current Visit: Yes   Status: Acute   





(3) S/P AAA (abdominal aortic aneurysm) repair


Current Visit: Yes   Status: Chronic   





(4) DVT prophylaxis


Current Visit: Yes   Status: Acute   





(5) HTN (hypertension)


Current Visit: Yes   Status: Chronic   


Qualifiers: 


   Qualified Code(s): I10 - Essential (primary) hypertension   





Subjective


Date of service: 06/28/21


Principal diagnosis: SBO, PAF with RVR


Interval history: 





Telemetry reviewed: Atrial fib 70s.  No events





Objective


                                        


                                Last Vital Signs











Temp  98.0 F   06/28/21 08:00


 


Pulse  62   06/28/21 08:00


 


Resp  16   06/28/21 08:00


 


BP  128/88   06/28/21 08:00


 


Pulse Ox  94   06/28/21 08:00

















- Physical Examination


General: No Apparent Distress


HEENT: Positive: EOMI, Normocephaly, Mucus Membranes Moist


Neck: Positive: neck supple, trachea midline


Cardiac: Positive: irregularly irregular, S1/S2


Lungs: Positive: Normal Exam, Normal Breath Sounds


Neuro: Positive: Grossly Intact


Abdomen: Positive: Other (NGT in situ)


Skin: Negative: Rash


Musculoskeletal: No Pain


Extremities: Present: upper extr. pulses, lower extr. pulses.  Absent: edema





- Imaging and Cardiology


EKG: report reviewed, image reviewed


Echo: report reviewed (6/23/2021 - EF 50-55%, mild pulm HTN, mild TR)





- Telemetry


EKG Rhythm: Atrial Fibrillation





- EKG


Sinus rhythms and dysrhythmias: sinus rhythm

## 2021-06-28 NOTE — PROGRESS NOTE
Assessment and Plan








Assessment:


High Grade Small Bowel Obstruction


ILEANA possibly 2/2 prerenal etiology in setting of vomiting and poor oral intake, 

hypotension, no obstruction


Hypotension


Hypokalemia


Hx of AAA repair








Plan:


- Renal function reviewed, SCr level was 1.1 today, non-oliguric


- On D5/1/2 NS infusion at 125 ml/hr


- UA showed elevated rbc, no significant proteinuria, no urine eosinophils


- No hydronephrosis noted on CT AP w/o contrast study on 6/19/21


- Renally dose medications


- Avoid nephrotoxic agents


- Obtain daily weights


- Constantino Catheter: No


- Renal plan reviewed by Dr. Parker








Subjective


Date of service: 06/28/21


Principal diagnosis: SBO, PAF with RVR


Interval history: 





Patient seen lying in bed. Reviewed renal labs. No family at bedside.





Objective





- Vital Signs


Vital signs: 


                               Vital Signs - 12hr











  06/27/21 06/27/21 06/28/21





  22:00 23:01 04:36


 


Temperature  98.6 F 98.4 F


 


Pulse Rate 90  84


 


Respiratory  16 18





Rate   


 


Blood Pressure  124/84 127/92


 


O2 Sat by Pulse   97





Oximetry   














  06/28/21





  08:00


 


Temperature 98.0 F


 


Pulse Rate 62


 


Respiratory 16





Rate 


 


Blood Pressure 128/88


 


O2 Sat by Pulse 94





Oximetry 














- General Appearance


General appearance: well-developed, appears stated age


EENT: ATNC, PERRL, hearing intact, vision intact


Neck: no JVD, supple


Respiratory: Present: Decreased Breath Sounds


Cardiology: S1S2


Gastrointestinal: normoactive bowel sounds


Integumentary: warm and dry


Neurologic: alert and oriented x3


Musculoskeletal: other (No edema)


Psychiatric: cooperative





- Lab





                                 06/27/21 10:31





                                 06/27/21 10:31


                             Most recent lab results











Calcium  7.7 mg/dL (8.4-10.2)  L  06/26/21  05:01    


 


Phosphorus  5.40 mg/dL (2.5-4.5)  H  06/20/21  05:24    


 


Magnesium  2.30 mg/dL (1.7-2.3)   06/24/21  07:07    


 


Urine Creatinine  198.5 mg/dL (0.1-20.0)  H  06/19/21  Unknown


 


Urine Sodium  11 mmol/L  06/19/21  Unknown


 


Urine Total Protein  38 mg/dL (5-11.8)  H  06/19/21  Unknown














Medications & Allergies





- Medications


Allergies/Adverse Reactions: 


                                    Allergies





No Known Allergies Allergy (Verified 06/19/21 15:01)


   








Home Medications: 


                                Home Medications











 Medication  Instructions  Recorded  Confirmed  Last Taken  Type


 


AtorvaSTATin 40 mg PO QHS 06/11/20 06/19/21 Unknown History


 


Apixaban [Eliquis] 5 mg PO Q12HR #60 tablet 06/27/21  Unknown Rx


 


Metoprolol Xl [Metoprolol 50 mg PO QDAY #30 tablet 06/27/21  Unknown Rx





SUCCINATE ER TAB]     


 


Sennosides Tab [Senokot] 8.6 mg PO BID #60 tablet 06/27/21  Unknown Rx


 


oxyCODONE /ACETAMINOPHEN [Percocet 1 tab PO Q4H PRN #14 tablet 06/27/21  Unknown

 Rx





5/325 mg]     











Active Medications: 














Generic Name Dose Route Start Last Admin





  Trade Name Freq  PRN Reason Stop Dose Admin


 


Apixaban  5 mg  06/27/21 10:00  06/28/21 08:58





  Apixaban 5 Mg Tab  PO   5 mg





  Q12HR LEO   Administration





  Protocol  


 


Hydrophilic Ointment  1 applic  06/22/21 09:43  06/24/21 17:44





  Lip Therapy Vaseline  TP   1 applic





  AS DIRECT PRN   Administration





  Dry Lips  


 


Dextrose/Sodium Chloride  1,000 mls @ 125 mls/hr  06/24/21 05:00  06/27/21 05:27





  D5/0.45ns  IV   75 mls/hr





  AS DIRECT LEO   Administration


 


Magnesium Hydroxide  30 ml  06/25/21 14:00  06/28/21 08:59





  Magnesium Hydroxide (Mom) Oral Liqd Udc  PO   30 ml





  QDAY LEO   Administration


 


Metoprolol Succinate  50 mg  06/27/21 10:00  06/28/21 08:59





  Metoprolol Succinate Xl 50 Mg Tab  PO   50 mg





  QDAY LEO   Administration


 


Ondansetron HCl  4 mg  06/19/21 11:29  06/20/21 02:43





  Ondansetron 4 Mg/2 Ml Inj  IV   4 mg





  Q6H PRN   Administration





  N/V unrelieved by Bubba  


 


Oxycodone/Acetaminophen  1 tab  06/26/21 11:49  06/27/21 21:39





  Oxycodone /Acetaminophen 5-325mg Tab  PO   1 tab





  Q4H PRN   Administration





  Pain, Moderate (4-6)

## 2021-06-28 NOTE — PROGRESS NOTE
Assessment and Plan


Assessment and plan: 


66-year-old -American male with past medical history significant for 

aortic aneurysm repair, hypertension, penile implant presented to the emergency 

department with complaints of abdominal pain that started 4 days ago.  Patient 

states the pain was sharp, 10 out of 10 intensity, with no radiation, no 

aggravating or alleviating factors identified, patient has associated persistent

nausea and vomiting.  She was not eating and drinking for the last 4 days.  

Patient did not have any bowel movement, could not pass gas.  Patient is to call

his vascular surgeon and told him to go to the emergency department, his 

vascular surgeon stated it is not a vascular problem.


   In the emergency department patient was hypotensive and was given bolus of IV

fluids and blood pressure was corrected.  CT abdomen and pelvis showed small 

bowel obstruction with transition point.  General surgery was consulted.  

Patient has elevated BUN and creatinine and nephrology was consulted.  Patient 

admitted to the floor for further evaluation and management.








Management plan was discussed with the patient and was in agreement with the 

plan of care.








6/20/2021


-Blood pressure was low this morning and was given a bolus of normal saline.  

Continue with normal saline at 150 mL/h


-Slight improvement in creatinine level, patient is hypokalemic and was given 

potassium


-Nephrology consult appreciated


-Patient was seen by general surgery and recommend NG tube decompression





6/21/2021


-ILEANA is improving, continue with IV fluids.  Nephrology consult appreciated.


-Abdominal x-ray this morning showed worsening of small bowel obstruction, 

patient was evaluated by general surgery and considering to do laparotomy and 

lysis of adhesion, will follow with the surgeon


-Chest x-ray showed 1.7 cm mass on the right perihilar area, I ordered a CT 

chest without contrast


-Urine culture is negative and I stop IV antibiotic





6/22: s/p EX LAP, Continue supportive care, Awaiting surgical re-evaluation, 

Renal function showing some improvement. Continue to monitor. 





6/23: Noted tachycardia and hypotensive. Discussed with nursing staff, will give

a bolus of fluids and obtain EKG. Patient is on metoprolol outpatient, will 

continue to hold. Renal function improving. 





6/24: Patient yesterday noted to have Atrial fibrillation with RVR. Started on 

IV metoprolol and Enoxaparin. Will transfer to Telemetry. He remains on strict N

PO .





06/25/21 patient seen and examined.  Patient has 1 bowel movement.  No nausea 

vomiting.  Surgery ordered clear liquid diet from dinner.  BUN 19 creatinine 

0.9.  Potassium 5.2.  Continue current management.    Recheck CBC BMP in the 

morning.  PT evaluation








06/26/21 patient is seen and examined.  Patient feels better.  Patient has 1 

Nieves bowel movement.  No nausea vomiting.  Full liquid diet.  Out of bed to 

chair.  Potassium is 3.3.  KCl 40 mg p.o. x1 dose.  Continue current management.

 Discharge plan when cleared by surgery.  PT evaluation





6/27: Patient continues to tolerate full liquid diet.  No new complaints this 

morning bowel movement.  No symptomatic bradycardia noted.  I discussed changes 

to his medication including addition of Eliquis and side effects profile 

precautions including fall precautions patient verbalized understanding of also 

discussed dietary management including following surgeon's recommendation for 

advancement of diet and need to follow-up with the surgeon for removal of 

staples he verbalized understanding.  He will be discharged today if okay with t

lashay surgeon





6/28: Patient discharged yesterday not sure why he is still here he does not 

know either nurse will contact surgeon today he reports that he continues to 

tolerate his diet did have bowel movement bowel sounds are noted.  He still 

remembers our discussion on discharge will proceed with discharge as planned no 

new changes noted.








High-grade small bowel obstruction S/P Enterolysis





Hypotension





Acute renal failure secondary to vasomotor nephropathy








History of aortic aneurysm repair





History


Interval history: 


Patient was seen and evaluated this morning, no new complaints this morning 

tolerating diet still





Hospitalist Physical





- Physical exam


Narrative exam: 


 Not in cardiopulmonary distress. 


 The patient appeared well nourished and normally developed. 


 Vital signs as documented.


 Head exam is unremarkable.


 No scleral icterus .


 Neck is without jugular venous distension, thyromegaly, or carotid bruits. 


 Lungs are clear to auscultation.


Cardiac exam reveals regular rate and  Rhythm. 


Abdominal exam reveals normal bowel sounds, nontender, no organomegaly.  

Surgical staples in place


Extremities are nonedematous and both femoral and pedal pulses are normal.


CNS: Alert and oriented 3.  No focal weakness.

















- Constitutional


Vitals: 


                                        











Temp Pulse Resp BP Pulse Ox


 


 98.0 F   62   16   128/88   94 


 


 06/28/21 08:00  06/28/21 08:00  06/28/21 08:00  06/28/21 08:00  06/28/21 08:00











General appearance: Present: no acute distress





HEART Score





- HEART Score


Troponin: 


                                        











Troponin T  < 0.010 ng/mL (0.00-0.029)   06/24/21  07:07    














Results





- Labs


CBC & Chem 7: 


                                 06/27/21 10:31





                                 06/27/21 10:31


Labs: 


                             Laboratory Last Values











WBC  8.5 K/mm3 (4.5-11.0)   06/27/21  10:31    


 


RBC  3.96 M/mm3 (3.65-5.03)   06/27/21  10:31    


 


Hgb  13.6 gm/dl (11.8-15.2)   06/27/21  10:31    


 


Hct  38.2 % (35.5-45.6)   06/27/21  10:31    


 


MCV  96 fl (84-94)  H  06/27/21  10:31    


 


MCH  34 pg (28-32)  H  06/27/21  10:31    


 


MCHC  36 % (32-34)  H  06/27/21  10:31    


 


RDW  12.7 % (13.2-15.2)  L  06/27/21  10:31    


 


Plt Count  229 K/mm3 (140-440)   06/27/21  10:31    


 


Lymph % (Auto)  15.2 % (13.4-35.0)   06/26/21  05:01    


 


Mono % (Auto)  11.8 % (0.0-7.3)  H  06/26/21  05:01    


 


Eos % (Auto)  4.0 % (0.0-4.3)   06/26/21  05:01    


 


Baso % (Auto)  0.4 % (0.0-1.8)   06/26/21  05:01    


 


Lymph # (Auto)  1.4 K/mm3 (1.2-5.4)   06/26/21  05:01    


 


Mono # (Auto)  1.1 K/mm3 (0.0-0.8)  H  06/26/21  05:01    


 


Eos # (Auto)  0.4 K/mm3 (0.0-0.4)   06/26/21  05:01    


 


Baso # (Auto)  0.0 K/mm3 (0.0-0.1)   06/26/21  05:01    


 


Add Manual Diff  Complete   06/19/21  08:21    


 


Total Counted  100   06/19/21  08:21    


 


Seg Neutrophils %  68.6 % (40.0-70.0)   06/26/21  05:01    


 


Seg Neuts % (Manual)  81.0 % (40.0-70.0)  H  06/19/21  08:21    


 


Lymphocytes % (Manual)  6.0 % (13.4-35.0)  L  06/19/21  08:21    


 


Monocytes % (Manual)  12.0 % (0.0-7.3)  H  06/19/21  08:21    


 


Eosinophils % (Manual)  1.0 % (0.0-4.3)   06/19/21  08:21    


 


Nucleated RBC %  Not Reportable   06/19/21  08:21    


 


Seg Neutrophils #  6.2 K/mm3 (1.8-7.7)   06/26/21  05:01    


 


Seg Neutrophils # Man  7.0 K/mm3 (1.8-7.7)   06/19/21  08:21    


 


Band Neutrophils #  0.0 K/mm3  06/19/21  08:21    


 


Lymphocytes # (Manual)  0.5 K/mm3 (1.2-5.4)  L  06/19/21  08:21    


 


Abs React Lymphs (Man)  0.0 K/mm3  06/19/21  08:21    


 


Monocytes # (Manual)  1.0 K/mm3 (0.0-0.8)  H  06/19/21  08:21    


 


Eosinophils # (Manual)  0.1 K/mm3 (0.0-0.4)   06/19/21  08:21    


 


Basophils # (Manual)  0.0 K/mm3 (0.0-0.1)   06/19/21  08:21    


 


Metamyelocytes #  0.0 K/mm3  06/19/21  08:21    


 


Myelocytes #  0.0 K/mm3  06/19/21  08:21    


 


Promyelocytes #  0.0 K/mm3  06/19/21  08:21    


 


Blast Cells #  0.0 K/mm3  06/19/21  08:21    


 


WBC Morphology  Not Reportable   06/19/21  08:21    


 


WBC Morphology  TNR   06/19/21  08:21    


 


Hypersegmented Neuts  Not Reportable   06/19/21  08:21    


 


Hyposegmented Neuts  Not Reportable   06/19/21  08:21    


 


Hypogranular Neuts  Not Reportable   06/19/21  08:21    


 


Smudge Cells  Not Reportable   06/19/21  08:21    


 


Toxic Granulation  Not Reportable   06/19/21  08:21    


 


Toxic Vacuolation  Not Reportable   06/19/21  08:21    


 


Dohle Bodies  Not Reportable   06/19/21  08:21    


 


Pelger-Huet Anomaly  Not Reportable   06/19/21  08:21    


 


Pepito Rods  Not Reportable   06/19/21  08:21    


 


Platelet Estimate  Consistent w auto   06/19/21  08:21    


 


Clumped Platelets  Not Reportable   06/19/21  08:21    


 


Plt Clumps, EDTA  Not Reportable   06/19/21  08:21    


 


Large Platelets  Not Reportable   06/19/21  08:21    


 


Giant Platelets  Not Reportable   06/19/21  08:21    


 


Platelet Satelliting  Not Reportable   06/19/21  08:21    


 


Plt Morphology Comment  Not Reportable   06/19/21  08:21    


 


RBC Morphology  Not Reportable   06/19/21  08:21    


 


Dimorphic RBCs  Not Reportable   06/19/21  08:21    


 


Polychromasia  Not Reportable   06/19/21  08:21    


 


Hypochromasia  Not Reportable   06/19/21  08:21    


 


Poikilocytosis  Not Reportable   06/19/21  08:21    


 


Anisocytosis  Not Reportable   06/19/21  08:21    


 


Microcytosis  Not Reportable   06/19/21  08:21    


 


Macrocytosis  Not Reportable   06/19/21  08:21    


 


Spherocytes  Not Reportable   06/19/21  08:21    


 


Pappenheimer Bodies  Not Reportable   06/19/21  08:21    


 


Sickle Cells  Not Reportable   06/19/21  08:21    


 


Target Cells  Not Reportable   06/19/21  08:21    


 


Tear Drop Cells  Not Reportable   06/19/21  08:21    


 


Ovalocytes  Not Reportable   06/19/21  08:21    


 


Helmet Cells  Not Reportable   06/19/21  08:21    


 


Tse-[place] Bodies  Not Reportable   06/19/21  08:21    


 


Cabot Rings  Not Reportable   06/19/21  08:21    


 


Primitivo Cells  Few   06/19/21  08:21    


 


Bite Cells  Not Reportable   06/19/21  08:21    


 


Crenated Cell  Not Reportable   06/19/21  08:21    


 


Elliptocytes  Few   06/19/21  08:21    


 


Acanthocytes (Spur)  Not Reportable   06/19/21  08:21    


 


Rouleaux  Not Reportable   06/19/21  08:21    


 


Hemoglobin C Crystals  Not Reportable   06/19/21  08:21    


 


Schistocytes  Rare   06/19/21  08:21    


 


Malaria parasites  Not Reportable   06/19/21  08:21    


 


Linus Bodies  Not Reportable   06/19/21  08:21    


 


Hem Pathologist Commnt  No   06/19/21  08:21    


 


PT  17.3 Sec. (12.2-14.9)  H  06/27/21  10:31    


 


INR  1.35  (0.87-1.13)  H  06/27/21  10:31    


 


APTT  36.3 Sec. (24.2-36.6)   06/27/21  10:31    


 


Sodium  141 mmol/L (137-145)   06/26/21  05:01    


 


Potassium  3.3 mmol/L (3.6-5.0)  L D 06/26/21  05:01    


 


Chloride  105.1 mmol/L ()   06/26/21  05:01    


 


Carbon Dioxide  29 mmol/L (22-30)   06/26/21  05:01    


 


Anion Gap  10 mmol/L  06/26/21  05:01    


 


BUN  14 mg/dL (9-20)   06/26/21  05:01    


 


Creatinine  1.1 mg/dL (0.8-1.3)   06/27/21  10:31    


 


Estimated GFR  > 60 ml/min  06/27/21  10:31    


 


BUN/Creatinine Ratio  16 %  06/26/21  05:01    


 


Glucose  107 mg/dL ()  H  06/26/21  05:01    


 


POC Glucose  82 mg/dL ()   06/25/21  12:24    


 


Calcium  7.7 mg/dL (8.4-10.2)  L  06/26/21  05:01    


 


Phosphorus  5.40 mg/dL (2.5-4.5)  H  06/20/21  05:24    


 


Magnesium  2.30 mg/dL (1.7-2.3)   06/24/21  07:07    


 


Total Bilirubin  0.80 mg/dL (0.1-1.2)   06/19/21  08:21    


 


Direct Bilirubin  0.2 mg/dL (0-0.2)   06/19/21  08:21    


 


Indirect Bilirubin  0.6 mg/dL  06/19/21  08:21    


 


AST  16 units/L (5-40)   06/19/21  08:21    


 


ALT  9 units/L (7-56)   06/19/21  08:21    


 


Alkaline Phosphatase  72 units/L ()   06/19/21  08:21    


 


Troponin T  < 0.010 ng/mL (0.00-0.029)   06/24/21  07:07    


 


Total Protein  8.0 g/dL (6.3-8.2)   06/19/21  08:21    


 


Albumin  4.3 g/dL (3.9-5)   06/19/21  08:21    


 


Albumin/Globulin Ratio  1.2 %  06/19/21  08:21    


 


Lipase  18 units/L (13-60)   06/19/21  08:21    


 


Urine Color  Ema  (Yellow)   06/19/21  09:23    


 


Urine Turbidity  Cloudy  (Clear)   06/19/21  09:23    


 


Urine pH  5.0  (5.0-7.0)   06/19/21  09:23    


 


Ur Specific Gravity  1.017  (1.003-1.030)   06/19/21  09:23    


 


Urine Protein  100 mg/dl mg/dL (Negative)   06/19/21  09:23    


 


Urine Glucose (UA)  Neg mg/dL (Negative)   06/19/21  09:23    


 


Urine Ketones  Neg mg/dL (Negative)   06/19/21  09:23    


 


Urine Blood  Mod  (Negative)   06/19/21  09:23    


 


Urine Nitrite  Neg  (Negative)   06/19/21  09:23    


 


Urine Bilirubin  Neg  (Negative)   06/19/21  09:23    


 


Urine Urobilinogen  2.0 mg/dL (<2.0)   06/19/21  09:23    


 


Ur Leukocyte Esterase  Neg  (Negative)   06/19/21  09:23    


 


Urine WBC (Auto)  9.0 /HPF (0.0-6.0)  H  06/19/21  09:23    


 


Urine RBC (Auto)  10.0 /HPF (0.0-6.0)   06/19/21  09:23    


 


U Epithel Cells (Auto)  2.0 /HPF (0-13.0)   06/19/21  09:23    


 


Hyaline Casts  23 /LPF  06/19/21  09:23    


 


Urine Mucus  2+ /HPF  06/19/21  09:23    


 


Urine Eosinophils  None seen  (None Seen)   06/19/21  Unknown


 


Urine Creatinine  198.5 mg/dL (0.1-20.0)  H  06/19/21  Unknown


 


Urine Microalbumin  2.6 mg/dL (0.1-34.0)   06/19/21  Unknown


 


Microalb/Creat Ratio  13.0 ug/mg  06/19/21  Unknown


 


Protein/Creatinin Ratio  0.19   06/19/21  Unknown


 


Urine Sodium  11 mmol/L  06/19/21  Unknown


 


Urine Total Protein  38 mg/dL (5-11.8)  H  06/19/21  Unknown


 


Blood Type  A POSITIVE   06/21/21  12:15    


 


Antibody Screen  Negative   06/21/21  12:15    











Constantino/IV: 


                                        





Voiding Method                   Urinal











Active Medications





- Current Medications


Current Medications: 














Generic Name Dose Route Start Last Admin





  Trade Name Freq  PRN Reason Stop Dose Admin


 


Apixaban  5 mg  06/27/21 10:00  06/28/21 08:58





  Apixaban 5 Mg Tab  PO   5 mg





  Q12HR LEO   Administration





  Protocol  


 


Hydrophilic Ointment  1 applic  06/22/21 09:43  06/24/21 17:44





  Lip Therapy Vaseline  TP   1 applic





  AS DIRECT PRN   Administration





  Dry Lips  


 


Dextrose/Sodium Chloride  1,000 mls @ 125 mls/hr  06/24/21 05:00  06/27/21 05:27





  D5/0.45ns  IV   75 mls/hr





  AS DIRECT LEO   Administration


 


Magnesium Hydroxide  30 ml  06/25/21 14:00  06/28/21 08:59





  Magnesium Hydroxide (Mom) Oral Liqd Udc  PO   30 ml





  QDAY LEO   Administration


 


Metoprolol Succinate  50 mg  06/27/21 10:00  06/28/21 08:59





  Metoprolol Succinate Xl 50 Mg Tab  PO   50 mg





  QDAY LEO   Administration


 


Ondansetron HCl  4 mg  06/19/21 11:29  06/20/21 02:43





  Ondansetron 4 Mg/2 Ml Inj  IV   4 mg





  Q6H PRN   Administration





  N/V unrelieved by Reglan  


 


Oxycodone/Acetaminophen  1 tab  06/26/21 11:49  06/27/21 21:39





  Oxycodone /Acetaminophen 5-325mg Tab  PO   1 tab





  Q4H PRN   Administration





  Pain, Moderate (4-6)  














Nutrition/Malnutrition Assess





- Dietary Evaluation


Nutrition/Malnutrition Findings: 


                                        





Nutrition Notes                                            Start:  06/21/21 11:

22


Freq:                                                      Status: Active       




Protocol:                                                                       




 Document     06/25/21 12:42  LM  (Rec: 06/25/21 12:44  LM  [id number])


 Nutrition Notes


     Initial or Follow up                        Brief Note


     Current Diet                                NPO


     Weight Status                               Appropriate


     Subjective/Other Information                Pt remains NPO.


 Nutrition Intervention


     Change Diet Order:                          Diet advancement when


                                                 medically feasible


     Goal #1                                     Advance diet to meet nutrient


                                                 needs


     Anticipated Discharge Needs:                Unable to identify at this


                                                 time


     Follow-Up By:                               06/28/21


     Additional Comments                         FU for diet advancement or


                                                 plan of care

## 2021-06-28 NOTE — PROGRESS NOTE
Assessment and Plan





- Patient Problems


(1) SBO (small bowel obstruction)


Current Visit: Yes   Status: Acute   


Plan to address problem: 


1) SBO - resolved


 2) Okay for discharge


 3) F/u in my office in one week








Subjective


Date of service: 06/28/21


Patient Reports: Positive: no new complaints, tolerating a regular diet, flatus,

bowel movement





Objective


                               Vital Signs - 12hr











  06/28/21 06/28/21





  04:36 08:00


 


Temperature 98.4 F 98.0 F


 


Pulse Rate 84 62


 


Respiratory 18 16





Rate  


 


Blood Pressure 127/92 128/88


 


O2 Sat by Pulse 97 94





Oximetry  














- Abdomen


soft, bowel sounds normal (NT, ND)





- Labs





                                 06/27/21 10:31





                                 06/27/21 10:31